# Patient Record
Sex: MALE | Race: WHITE | NOT HISPANIC OR LATINO | Employment: FULL TIME | URBAN - NONMETROPOLITAN AREA
[De-identification: names, ages, dates, MRNs, and addresses within clinical notes are randomized per-mention and may not be internally consistent; named-entity substitution may affect disease eponyms.]

---

## 2017-07-07 ENCOUNTER — HOSPITAL ENCOUNTER (EMERGENCY)
Facility: HOSPITAL | Age: 57
Discharge: HOME | End: 2017-07-07
Attending: EMERGENCY MEDICINE | Admitting: EMERGENCY MEDICINE

## 2017-07-07 ENCOUNTER — APPOINTMENT (OUTPATIENT)
Dept: CT IMAGING | Facility: HOSPITAL | Age: 57
End: 2017-07-07

## 2017-07-07 VITALS
OXYGEN SATURATION: 95 % | SYSTOLIC BLOOD PRESSURE: 150 MMHG | HEART RATE: 64 BPM | BODY MASS INDEX: 37.67 KG/M2 | TEMPERATURE: 98.8 F | DIASTOLIC BLOOD PRESSURE: 98 MMHG | HEIGHT: 67 IN | WEIGHT: 240 LBS | RESPIRATION RATE: 18 BRPM

## 2017-07-07 DIAGNOSIS — R10.13 EPIGASTRIC ABDOMINAL PAIN: Primary | ICD-10-CM

## 2017-07-07 LAB
ALBUMIN SERPL-MCNC: 4.1 G/DL (ref 3.5–5)
ALBUMIN/GLOB SERPL: 1.3 G/DL (ref 1–2)
ALP SERPL-CCNC: 105 U/L (ref 38–126)
ALT SERPL W P-5'-P-CCNC: 55 U/L (ref 13–69)
AMYLASE SERPL-CCNC: 92 U/L (ref 30–110)
ANION GAP SERPL CALCULATED.3IONS-SCNC: 13.1 MMOL/L
AST SERPL-CCNC: 50 U/L (ref 15–46)
BASOPHILS # BLD AUTO: 0.04 10*3/MM3 (ref 0–0.2)
BASOPHILS NFR BLD AUTO: 0.3 % (ref 0–2.5)
BILIRUB SERPL-MCNC: 0.3 MG/DL (ref 0.2–1.3)
BILIRUB UR QL STRIP: NEGATIVE
BUN BLD-MCNC: 15 MG/DL (ref 7–20)
BUN/CREAT SERPL: 15 (ref 6.3–21.9)
CALCIUM SPEC-SCNC: 9.4 MG/DL (ref 8.4–10.2)
CHLORIDE SERPL-SCNC: 107 MMOL/L (ref 98–107)
CLARITY UR: CLEAR
CO2 SERPL-SCNC: 29 MMOL/L (ref 26–30)
COLOR UR: YELLOW
CREAT BLD-MCNC: 1 MG/DL (ref 0.6–1.3)
DEPRECATED RDW RBC AUTO: 46.3 FL (ref 37–54)
EOSINOPHIL # BLD AUTO: 0.24 10*3/MM3 (ref 0–0.7)
EOSINOPHIL NFR BLD AUTO: 2 % (ref 0–7)
ERYTHROCYTE [DISTWIDTH] IN BLOOD BY AUTOMATED COUNT: 14.3 % (ref 11.5–14.5)
GFR SERPL CREATININE-BSD FRML MDRD: 78 ML/MIN/1.73
GLOBULIN UR ELPH-MCNC: 3.2 GM/DL
GLUCOSE BLD-MCNC: 89 MG/DL (ref 74–98)
GLUCOSE UR STRIP-MCNC: ABNORMAL MG/DL
HCT VFR BLD AUTO: 42.9 % (ref 42–52)
HGB BLD-MCNC: 14 G/DL (ref 14–18)
HGB UR QL STRIP.AUTO: NEGATIVE
IMM GRANULOCYTES # BLD: 0.05 10*3/MM3 (ref 0–0.06)
IMM GRANULOCYTES NFR BLD: 0.4 % (ref 0–0.6)
KETONES UR QL STRIP: NEGATIVE
LEUKOCYTE ESTERASE UR QL STRIP.AUTO: NEGATIVE
LIPASE SERPL-CCNC: 212 U/L (ref 23–300)
LYMPHOCYTES # BLD AUTO: 2.63 10*3/MM3 (ref 0.6–3.4)
LYMPHOCYTES NFR BLD AUTO: 22.4 % (ref 10–50)
MCH RBC QN AUTO: 29.3 PG (ref 27–31)
MCHC RBC AUTO-ENTMCNC: 32.6 G/DL (ref 30–37)
MCV RBC AUTO: 89.7 FL (ref 80–94)
MONOCYTES # BLD AUTO: 1.12 10*3/MM3 (ref 0–0.9)
MONOCYTES NFR BLD AUTO: 9.5 % (ref 0–12)
NEUTROPHILS # BLD AUTO: 7.67 10*3/MM3 (ref 2–6.9)
NEUTROPHILS NFR BLD AUTO: 65.4 % (ref 37–80)
NITRITE UR QL STRIP: NEGATIVE
NRBC BLD MANUAL-RTO: 0 /100 WBC (ref 0–0)
PH UR STRIP.AUTO: 6 [PH] (ref 5–8)
PLATELET # BLD AUTO: 296 10*3/MM3 (ref 130–400)
PMV BLD AUTO: 10 FL (ref 6–12)
POTASSIUM BLD-SCNC: 4.1 MMOL/L (ref 3.5–5.1)
PROT SERPL-MCNC: 7.3 G/DL (ref 6.3–8.2)
PROT UR QL STRIP: NEGATIVE
RBC # BLD AUTO: 4.78 10*6/MM3 (ref 4.7–6.1)
SODIUM BLD-SCNC: 145 MMOL/L (ref 137–145)
SP GR UR STRIP: 1.02 (ref 1–1.03)
TROPONIN I SERPL-MCNC: <0.012 NG/ML (ref 0–0.03)
UROBILINOGEN UR QL STRIP: ABNORMAL
WBC NRBC COR # BLD: 11.75 10*3/MM3 (ref 4.8–10.8)

## 2017-07-07 PROCEDURE — 99284 EMERGENCY DEPT VISIT MOD MDM: CPT

## 2017-07-07 PROCEDURE — 82150 ASSAY OF AMYLASE: CPT | Performed by: EMERGENCY MEDICINE

## 2017-07-07 PROCEDURE — 80053 COMPREHEN METABOLIC PANEL: CPT | Performed by: EMERGENCY MEDICINE

## 2017-07-07 PROCEDURE — 74177 CT ABD & PELVIS W/CONTRAST: CPT

## 2017-07-07 PROCEDURE — 0 DIATRIZOATE MEGLUMINE & SODIUM PER 1 ML: Performed by: EMERGENCY MEDICINE

## 2017-07-07 PROCEDURE — 93005 ELECTROCARDIOGRAM TRACING: CPT | Performed by: EMERGENCY MEDICINE

## 2017-07-07 PROCEDURE — 96374 THER/PROPH/DIAG INJ IV PUSH: CPT

## 2017-07-07 PROCEDURE — 84484 ASSAY OF TROPONIN QUANT: CPT | Performed by: EMERGENCY MEDICINE

## 2017-07-07 PROCEDURE — 81003 URINALYSIS AUTO W/O SCOPE: CPT | Performed by: EMERGENCY MEDICINE

## 2017-07-07 PROCEDURE — 0 IOPAMIDOL 61 % SOLUTION: Performed by: EMERGENCY MEDICINE

## 2017-07-07 PROCEDURE — 85025 COMPLETE CBC W/AUTO DIFF WBC: CPT | Performed by: EMERGENCY MEDICINE

## 2017-07-07 PROCEDURE — 83690 ASSAY OF LIPASE: CPT | Performed by: EMERGENCY MEDICINE

## 2017-07-07 RX ORDER — MELOXICAM 15 MG/1
15 TABLET ORAL DAILY
COMMUNITY
End: 2018-05-21

## 2017-07-07 RX ORDER — AZELASTINE HYDROCHLORIDE, FLUTICASONE PROPIONATE 137; 50 UG/1; UG/1
SPRAY, METERED NASAL
COMMUNITY
Start: 2014-09-29

## 2017-07-07 RX ORDER — PANTOPRAZOLE SODIUM 40 MG/10ML
80 INJECTION, POWDER, LYOPHILIZED, FOR SOLUTION INTRAVENOUS ONCE
Status: COMPLETED | OUTPATIENT
Start: 2017-07-07 | End: 2017-07-07

## 2017-07-07 RX ORDER — SODIUM CHLORIDE 0.9 % (FLUSH) 0.9 %
10 SYRINGE (ML) INJECTION AS NEEDED
Status: DISCONTINUED | OUTPATIENT
Start: 2017-07-07 | End: 2017-07-07 | Stop reason: HOSPADM

## 2017-07-07 RX ADMIN — IOPAMIDOL 100 ML: 612 INJECTION, SOLUTION INTRAVENOUS at 07:41

## 2017-07-07 RX ADMIN — PANTOPRAZOLE SODIUM 80 MG: 40 INJECTION, POWDER, FOR SOLUTION INTRAVENOUS at 05:29

## 2017-07-07 RX ADMIN — DIATRIZOATE MEGLUMINE AND DIATRIZOATE SODIUM 30 ML: 660; 100 LIQUID ORAL; RECTAL at 07:41

## 2017-07-07 NOTE — ED PROVIDER NOTES
Subjective   HPI Comments: Patient is a 56-year-old male who reports emergency department complaining of a 3 day history of upper abdominal discomfort.  He has no nausea vomiting or diarrhea and is tolerating by mouth without difficulty.  He states that the pain gets worse when he lies flat and he feels like he can't breathe due to the discomfort.  He feels bloated and distended in the upper abdomen.  No chest pain.      History provided by:  Patient      Review of Systems   Constitutional: Negative for chills and fever.   HENT: Negative for congestion.    Respiratory: Negative for cough and shortness of breath.    Cardiovascular: Negative for chest pain.   Gastrointestinal: Positive for abdominal distention and abdominal pain. Negative for constipation, diarrhea, nausea and vomiting.   Musculoskeletal: Negative for back pain and neck pain.   Neurological: Negative for headaches.   All other systems reviewed and are negative.      Past Medical History:   Diagnosis Date   • Hyperlipidemia        Allergies   Allergen Reactions   • No Known Drug Allergy        Past Surgical History:   Procedure Laterality Date   • CHOLECYSTECTOMY         History reviewed. No pertinent family history.    Social History     Social History   • Marital status:      Spouse name: N/A   • Number of children: N/A   • Years of education: N/A     Social History Main Topics   • Smoking status: Former Smoker   • Smokeless tobacco: None   • Alcohol use No   • Drug use: No   • Sexual activity: Defer     Other Topics Concern   • None     Social History Narrative   • None           Objective   Physical Exam   Constitutional: He is oriented to person, place, and time. He appears well-developed and well-nourished. No distress.   Somewhat obese but otherwise healthy-appearing   HENT:   Head: Normocephalic and atraumatic.   Eyes: EOM are normal. Pupils are equal, round, and reactive to light.   Neck: Neck supple.   Cardiovascular: Normal rate,  regular rhythm and normal heart sounds.    Pulmonary/Chest: Effort normal and breath sounds normal.   Abdominal: Soft.   Protuberant abdomen but not obviously distended, moderate tenderness to palpation in the epigastric and especially right upper quadrant.  No other abdominal tenderness   Musculoskeletal: Normal range of motion. He exhibits no edema.   Neurological: He is alert and oriented to person, place, and time.   Skin: Skin is warm and dry. He is not diaphoretic.   Psychiatric: He has a normal mood and affect. His behavior is normal. Thought content normal.   Nursing note and vitals reviewed.      ECG 12 Lead    Date/Time: 7/7/2017 5:45 AM  Performed by: LOGAN REBOLLAR  Authorized by: LOGAN REBOLLAR   Interpreted by physician  Rhythm: sinus rhythm  Rate: normal  QRS axis: normal  Clinical impression: non-specific ECG               ED Course  ED Course                  MDM    Final diagnoses:   Epigastric abdominal pain            Logan Rebollar MD  07/07/17 8743

## 2017-07-07 NOTE — ED PROVIDER NOTES
700am Assumed care from off-going team. Briefly, this is a 56 y.o. M here w/ epigastric abd pain, reassuring VS and exam, unremarkable labs who is awaiting CT abd/pelvs. Plan to f/u on imaging and reassess.     8:28 AM Pt states pain improved. CT abd/pelvis unremarkable. Discussed results w/ pt as well as the importance of f/u within the next 2-3 days w/ pcp for re-evaluation. Discussed return to care precautions.       Gasper Alicea MD  07/07/17 1521

## 2018-01-22 ENCOUNTER — TRANSCRIBE ORDERS (OUTPATIENT)
Dept: MRI IMAGING | Facility: HOSPITAL | Age: 58
End: 2018-01-22

## 2018-01-22 ENCOUNTER — TRANSCRIBE ORDERS (OUTPATIENT)
Dept: CT IMAGING | Facility: HOSPITAL | Age: 58
End: 2018-01-22

## 2018-01-22 DIAGNOSIS — M25.462 KNEE EFFUSION, LEFT: Primary | ICD-10-CM

## 2018-01-22 DIAGNOSIS — M25.551 RIGHT HIP PAIN: Primary | ICD-10-CM

## 2018-01-22 DIAGNOSIS — G44.309 POST-TRAUMATIC HEADACHE, NOT INTRACTABLE, UNSPECIFIED CHRONICITY PATTERN: Primary | ICD-10-CM

## 2018-01-23 ENCOUNTER — HOSPITAL ENCOUNTER (OUTPATIENT)
Dept: CT IMAGING | Facility: HOSPITAL | Age: 58
Discharge: HOME | End: 2018-01-23

## 2018-01-23 ENCOUNTER — HOSPITAL ENCOUNTER (OUTPATIENT)
Dept: MRI IMAGING | Facility: HOSPITAL | Age: 58
Discharge: HOME | End: 2018-01-23
Admitting: NURSE PRACTITIONER

## 2018-01-23 DIAGNOSIS — M25.462 KNEE EFFUSION, LEFT: ICD-10-CM

## 2018-01-23 DIAGNOSIS — G44.309 POST-TRAUMATIC HEADACHE, NOT INTRACTABLE, UNSPECIFIED CHRONICITY PATTERN: ICD-10-CM

## 2018-01-23 DIAGNOSIS — M25.551 RIGHT HIP PAIN: ICD-10-CM

## 2018-01-23 PROCEDURE — 73721 MRI JNT OF LWR EXTRE W/O DYE: CPT

## 2018-01-23 PROCEDURE — 70450 CT HEAD/BRAIN W/O DYE: CPT

## 2018-01-23 PROCEDURE — 73700 CT LOWER EXTREMITY W/O DYE: CPT

## 2018-11-24 ENCOUNTER — HOSPITAL ENCOUNTER (EMERGENCY)
Facility: HOSPITAL | Age: 58
Discharge: HOME | End: 2018-11-24
Attending: EMERGENCY MEDICINE | Admitting: EMERGENCY MEDICINE

## 2018-11-24 VITALS
HEART RATE: 58 BPM | HEIGHT: 67 IN | DIASTOLIC BLOOD PRESSURE: 94 MMHG | BODY MASS INDEX: 36.1 KG/M2 | TEMPERATURE: 97.7 F | WEIGHT: 230 LBS | SYSTOLIC BLOOD PRESSURE: 135 MMHG | OXYGEN SATURATION: 97 % | RESPIRATION RATE: 16 BRPM

## 2018-11-24 DIAGNOSIS — R10.13 EPIGASTRIC PAIN: Primary | ICD-10-CM

## 2018-11-24 LAB
ALBUMIN SERPL-MCNC: 4.2 G/DL (ref 3.5–5)
ALBUMIN/GLOB SERPL: 1.6 G/DL (ref 1–2)
ALP SERPL-CCNC: 100 U/L (ref 38–126)
ALT SERPL W P-5'-P-CCNC: 26 U/L (ref 13–69)
ANION GAP SERPL CALCULATED.3IONS-SCNC: 8 MMOL/L (ref 10–20)
AST SERPL-CCNC: 25 U/L (ref 15–46)
BASOPHILS # BLD AUTO: 0.05 10*3/MM3 (ref 0–0.2)
BASOPHILS NFR BLD AUTO: 0.5 % (ref 0–2.5)
BILIRUB SERPL-MCNC: 0.3 MG/DL (ref 0.2–1.3)
BUN BLD-MCNC: 20 MG/DL (ref 7–20)
BUN/CREAT SERPL: 16.7 (ref 6.3–21.9)
CALCIUM SPEC-SCNC: 8.4 MG/DL (ref 8.4–10.2)
CHLORIDE SERPL-SCNC: 107 MMOL/L (ref 98–107)
CO2 SERPL-SCNC: 27 MMOL/L (ref 26–30)
CREAT BLD-MCNC: 1.2 MG/DL (ref 0.6–1.3)
DEPRECATED RDW RBC AUTO: 44.3 FL (ref 37–54)
EOSINOPHIL # BLD AUTO: 0.01 10*3/MM3 (ref 0–0.7)
EOSINOPHIL NFR BLD AUTO: 0.1 % (ref 0–7)
ERYTHROCYTE [DISTWIDTH] IN BLOOD BY AUTOMATED COUNT: 14.6 % (ref 11.5–14.5)
GFR SERPL CREATININE-BSD FRML MDRD: 63 ML/MIN/1.73
GLOBULIN UR ELPH-MCNC: 2.7 GM/DL
GLUCOSE BLD-MCNC: 123 MG/DL (ref 74–98)
HCT VFR BLD AUTO: 39.5 % (ref 42–52)
HGB BLD-MCNC: 12.5 G/DL (ref 14–18)
IMM GRANULOCYTES # BLD: 0.04 10*3/MM3 (ref 0–0.06)
IMM GRANULOCYTES NFR BLD: 0.4 % (ref 0–0.6)
LIPASE SERPL-CCNC: 351 U/L (ref 23–300)
LYMPHOCYTES # BLD AUTO: 2.47 10*3/MM3 (ref 0.6–3.4)
LYMPHOCYTES NFR BLD AUTO: 22.7 % (ref 10–50)
MCH RBC QN AUTO: 26.4 PG (ref 27–31)
MCHC RBC AUTO-ENTMCNC: 31.6 G/DL (ref 30–37)
MCV RBC AUTO: 83.3 FL (ref 80–94)
MONOCYTES # BLD AUTO: 0.78 10*3/MM3 (ref 0–0.9)
MONOCYTES NFR BLD AUTO: 7.2 % (ref 0–12)
NEUTROPHILS # BLD AUTO: 7.54 10*3/MM3 (ref 2–6.9)
NEUTROPHILS NFR BLD AUTO: 69.1 % (ref 37–80)
NRBC BLD MANUAL-RTO: 0 /100 WBC (ref 0–0)
PLATELET # BLD AUTO: 260 10*3/MM3 (ref 130–400)
PMV BLD AUTO: 10.6 FL (ref 6–12)
POTASSIUM BLD-SCNC: 4 MMOL/L (ref 3.5–5.1)
PROT SERPL-MCNC: 6.9 G/DL (ref 6.3–8.2)
RBC # BLD AUTO: 4.74 10*6/MM3 (ref 4.7–6.1)
SODIUM BLD-SCNC: 138 MMOL/L (ref 137–145)
WBC NRBC COR # BLD: 10.89 10*3/MM3 (ref 4.8–10.8)

## 2018-11-24 PROCEDURE — 99283 EMERGENCY DEPT VISIT LOW MDM: CPT

## 2018-11-24 PROCEDURE — 80053 COMPREHEN METABOLIC PANEL: CPT | Performed by: EMERGENCY MEDICINE

## 2018-11-24 PROCEDURE — 83690 ASSAY OF LIPASE: CPT | Performed by: EMERGENCY MEDICINE

## 2018-11-24 PROCEDURE — 96374 THER/PROPH/DIAG INJ IV PUSH: CPT

## 2018-11-24 PROCEDURE — 85025 COMPLETE CBC W/AUTO DIFF WBC: CPT | Performed by: EMERGENCY MEDICINE

## 2018-11-24 RX ORDER — SODIUM CHLORIDE 0.9 % (FLUSH) 0.9 %
10 SYRINGE (ML) INJECTION AS NEEDED
Status: DISCONTINUED | OUTPATIENT
Start: 2018-11-24 | End: 2018-11-24 | Stop reason: HOSPADM

## 2018-11-24 RX ORDER — FAMOTIDINE 10 MG/ML
20 INJECTION, SOLUTION INTRAVENOUS ONCE
Status: COMPLETED | OUTPATIENT
Start: 2018-11-24 | End: 2018-11-24

## 2018-11-24 RX ORDER — ALUMINA, MAGNESIA, AND SIMETHICONE 2400; 2400; 240 MG/30ML; MG/30ML; MG/30ML
15 SUSPENSION ORAL ONCE
Status: COMPLETED | OUTPATIENT
Start: 2018-11-24 | End: 2018-11-24

## 2018-11-24 RX ORDER — FAMOTIDINE 20 MG/1
20 TABLET, FILM COATED ORAL 2 TIMES DAILY
Qty: 14 TABLET | Refills: 0 | Status: SHIPPED | OUTPATIENT
Start: 2018-11-24 | End: 2022-03-17

## 2018-11-24 RX ADMIN — LIDOCAINE HYDROCHLORIDE 15 ML: 20 SOLUTION ORAL; TOPICAL at 03:01

## 2018-11-24 RX ADMIN — ALUMINUM HYDROXIDE, MAGNESIUM HYDROXIDE, AND DIMETHICONE 15 ML: 400; 400; 40 SUSPENSION ORAL at 03:01

## 2018-11-24 RX ADMIN — FAMOTIDINE 20 MG: 10 INJECTION, SOLUTION INTRAVENOUS at 03:04

## 2018-11-24 NOTE — ED PROVIDER NOTES
Subjective   History of Present Illness   57M otherwise healthy presenting with epigastric pain.  States this started about 30 hours ago after eating dinner.  He did not get better after omeprazole nor Tums.  It eased some drainage today but came back after he ate last night.  Denies fevers, chills, nausea, vomiting, dysuria, or other symptoms.    Review of Systems   Gastrointestinal: Positive for abdominal pain.   All other systems reviewed and are negative.      Past Medical History:   Diagnosis Date   • Hyperlipidemia        Allergies   Allergen Reactions   • No Known Drug Allergy        Past Surgical History:   Procedure Laterality Date   • CHOLECYSTECTOMY         No family history on file.    Social History     Socioeconomic History   • Marital status:      Spouse name: Not on file   • Number of children: Not on file   • Years of education: Not on file   • Highest education level: Not on file   Tobacco Use   • Smoking status: Former Smoker   Substance and Sexual Activity   • Alcohol use: No   • Drug use: No   • Sexual activity: Defer           Objective   Physical Exam   Constitutional: He is oriented to person, place, and time. He appears well-developed and well-nourished.  Non-toxic appearance. He does not appear ill. No distress.   HENT:   Head: Normocephalic.   Mouth/Throat: Oropharynx is clear and moist.   Eyes: Conjunctivae are normal. No scleral icterus.   Neck: Normal range of motion. Neck supple. No tracheal deviation present.   Cardiovascular: Normal rate, regular rhythm, normal heart sounds and intact distal pulses. Exam reveals no gallop and no friction rub.   No murmur heard.  Pulmonary/Chest: Effort normal and breath sounds normal. No stridor. No respiratory distress. He has no wheezes. He has no rales. He exhibits no tenderness.   Abdominal: Soft. He exhibits no shifting dullness, no distension, no fluid wave, no ascites, no pulsatile midline mass and no mass. There is no  hepatosplenomegaly, splenomegaly or hepatomegaly. There is tenderness in the epigastric area. There is no rigidity, no rebound and no guarding.   Musculoskeletal: Normal range of motion. He exhibits no deformity.   Neurological: He is alert and oriented to person, place, and time.   Skin: Skin is warm and dry. No rash noted. He is not diaphoretic. No erythema. No pallor.   Psychiatric: He has a normal mood and affect. His behavior is normal.   Nursing note and vitals reviewed.      Procedures           ED Course                  MDM  57M here w/ epigastric abd pain. AFVSS.  Mild epigastric tenderness to palpation.  No right upper quadrant pain suggestive of cholecystitis.  Differential broad but includes gastritis, peptic ulcer, less likely cholecystitis or pancreatitis, less likely transverse colitis.  Plan for labs, symptomatically treatment, and will reassess need for imaging.    3:17 AM labs are reassuring.  Patient states that he is feeling improved after passing gas.  I did offer CT scan for further evaluation, but did note that he has had similar presentation about one year ago with CT scan is unremarkable.  He stated that he felt comfortable going home and would come back or follow-up with primary care if he is not feeling improved or has any worsening.  We'll discharge home with return to care precautions and prescription for Pepcid.    Final diagnoses:   Epigastric pain            Gasper Alicea MD  11/24/18 3323

## 2019-03-29 ENCOUNTER — TRANSCRIBE ORDERS (OUTPATIENT)
Dept: ADMINISTRATIVE | Facility: HOSPITAL | Age: 59
End: 2019-03-29

## 2019-03-29 DIAGNOSIS — J34.89 SINUS PAIN: Primary | ICD-10-CM

## 2019-04-04 ENCOUNTER — APPOINTMENT (OUTPATIENT)
Dept: CT IMAGING | Facility: HOSPITAL | Age: 59
End: 2019-04-04

## 2019-04-10 ENCOUNTER — APPOINTMENT (OUTPATIENT)
Dept: CT IMAGING | Facility: HOSPITAL | Age: 59
End: 2019-04-10

## 2019-04-10 ENCOUNTER — HOSPITAL ENCOUNTER (OUTPATIENT)
Dept: CT IMAGING | Facility: HOSPITAL | Age: 59
Discharge: HOME | End: 2019-04-10
Admitting: ALLERGY & IMMUNOLOGY

## 2019-04-10 DIAGNOSIS — J34.89 SINUS PAIN: ICD-10-CM

## 2019-04-10 PROCEDURE — 70486 CT MAXILLOFACIAL W/O DYE: CPT

## 2020-01-24 PROCEDURE — 99283 EMERGENCY DEPT VISIT LOW MDM: CPT

## 2020-01-25 ENCOUNTER — HOSPITAL ENCOUNTER (EMERGENCY)
Facility: HOSPITAL | Age: 60
Discharge: HOME | End: 2020-01-25
Attending: EMERGENCY MEDICINE | Admitting: EMERGENCY MEDICINE

## 2020-01-25 VITALS
WEIGHT: 246.6 LBS | DIASTOLIC BLOOD PRESSURE: 88 MMHG | TEMPERATURE: 97.4 F | SYSTOLIC BLOOD PRESSURE: 127 MMHG | OXYGEN SATURATION: 95 % | RESPIRATION RATE: 18 BRPM | HEART RATE: 62 BPM | HEIGHT: 67 IN | BODY MASS INDEX: 38.71 KG/M2

## 2020-01-25 DIAGNOSIS — R10.9 ABDOMINAL PAIN, UNSPECIFIED ABDOMINAL LOCATION: Primary | ICD-10-CM

## 2020-01-25 LAB
ALBUMIN SERPL-MCNC: 4 G/DL (ref 3.5–5.2)
ALBUMIN/GLOB SERPL: 1.3 G/DL
ALP SERPL-CCNC: 129 U/L (ref 39–117)
ALT SERPL W P-5'-P-CCNC: 27 U/L (ref 1–41)
ANION GAP SERPL CALCULATED.3IONS-SCNC: 10.4 MMOL/L (ref 5–15)
AST SERPL-CCNC: 25 U/L (ref 1–40)
BASOPHILS # BLD AUTO: 0.06 10*3/MM3 (ref 0–0.2)
BASOPHILS NFR BLD AUTO: 0.8 % (ref 0–1.5)
BILIRUB SERPL-MCNC: 0.2 MG/DL (ref 0.2–1.2)
BILIRUB UR QL STRIP: NEGATIVE
BUN BLD-MCNC: 15 MG/DL (ref 6–20)
BUN/CREAT SERPL: 14.2 (ref 7–25)
CALCIUM SPEC-SCNC: 9 MG/DL (ref 8.6–10.5)
CHLORIDE SERPL-SCNC: 104 MMOL/L (ref 98–107)
CLARITY UR: CLEAR
CO2 SERPL-SCNC: 25.6 MMOL/L (ref 22–29)
COLOR UR: YELLOW
CREAT BLD-MCNC: 1.06 MG/DL (ref 0.76–1.27)
DEPRECATED RDW RBC AUTO: 42 FL (ref 37–54)
EOSINOPHIL # BLD AUTO: 0.54 10*3/MM3 (ref 0–0.4)
EOSINOPHIL NFR BLD AUTO: 7 % (ref 0.3–6.2)
ERYTHROCYTE [DISTWIDTH] IN BLOOD BY AUTOMATED COUNT: 13.3 % (ref 12.3–15.4)
GFR SERPL CREATININE-BSD FRML MDRD: 72 ML/MIN/1.73
GLOBULIN UR ELPH-MCNC: 3 GM/DL
GLUCOSE BLD-MCNC: 140 MG/DL (ref 65–99)
GLUCOSE UR STRIP-MCNC: NEGATIVE MG/DL
HCT VFR BLD AUTO: 45 % (ref 37.5–51)
HGB BLD-MCNC: 15 G/DL (ref 13–17.7)
HGB UR QL STRIP.AUTO: NEGATIVE
HOLD SPECIMEN: NORMAL
HOLD SPECIMEN: NORMAL
IMM GRANULOCYTES # BLD AUTO: 0.01 10*3/MM3 (ref 0–0.05)
IMM GRANULOCYTES NFR BLD AUTO: 0.1 % (ref 0–0.5)
KETONES UR QL STRIP: NEGATIVE
LEUKOCYTE ESTERASE UR QL STRIP.AUTO: NEGATIVE
LIPASE SERPL-CCNC: 51 U/L (ref 13–60)
LYMPHOCYTES # BLD AUTO: 2.73 10*3/MM3 (ref 0.7–3.1)
LYMPHOCYTES NFR BLD AUTO: 35.5 % (ref 19.6–45.3)
MCH RBC QN AUTO: 28.7 PG (ref 26.6–33)
MCHC RBC AUTO-ENTMCNC: 33.3 G/DL (ref 31.5–35.7)
MCV RBC AUTO: 86.2 FL (ref 79–97)
MONOCYTES # BLD AUTO: 0.6 10*3/MM3 (ref 0.1–0.9)
MONOCYTES NFR BLD AUTO: 7.8 % (ref 5–12)
NEUTROPHILS # BLD AUTO: 3.76 10*3/MM3 (ref 1.7–7)
NEUTROPHILS NFR BLD AUTO: 48.8 % (ref 42.7–76)
NITRITE UR QL STRIP: NEGATIVE
NRBC BLD AUTO-RTO: 0 /100 WBC (ref 0–0.2)
PH UR STRIP.AUTO: <=5 [PH] (ref 5–8)
PLATELET # BLD AUTO: 263 10*3/MM3 (ref 140–450)
PMV BLD AUTO: 10.3 FL (ref 6–12)
POTASSIUM BLD-SCNC: 4 MMOL/L (ref 3.5–5.2)
PROT SERPL-MCNC: 7 G/DL (ref 6–8.5)
PROT UR QL STRIP: NEGATIVE
RBC # BLD AUTO: 5.22 10*6/MM3 (ref 4.14–5.8)
SODIUM BLD-SCNC: 140 MMOL/L (ref 136–145)
SP GR UR STRIP: 1.02 (ref 1–1.03)
UROBILINOGEN UR QL STRIP: NORMAL
WBC NRBC COR # BLD: 7.7 10*3/MM3 (ref 3.4–10.8)
WHOLE BLOOD HOLD SPECIMEN: NORMAL
WHOLE BLOOD HOLD SPECIMEN: NORMAL

## 2020-01-25 PROCEDURE — 81003 URINALYSIS AUTO W/O SCOPE: CPT | Performed by: EMERGENCY MEDICINE

## 2020-01-25 PROCEDURE — 25010000002 ONDANSETRON PER 1 MG: Performed by: EMERGENCY MEDICINE

## 2020-01-25 PROCEDURE — 80053 COMPREHEN METABOLIC PANEL: CPT | Performed by: EMERGENCY MEDICINE

## 2020-01-25 PROCEDURE — 83690 ASSAY OF LIPASE: CPT | Performed by: EMERGENCY MEDICINE

## 2020-01-25 PROCEDURE — 96375 TX/PRO/DX INJ NEW DRUG ADDON: CPT

## 2020-01-25 PROCEDURE — 96374 THER/PROPH/DIAG INJ IV PUSH: CPT

## 2020-01-25 PROCEDURE — 85025 COMPLETE CBC W/AUTO DIFF WBC: CPT | Performed by: EMERGENCY MEDICINE

## 2020-01-25 RX ORDER — SUCRALFATE 1 G/1
1 TABLET ORAL 4 TIMES DAILY
Qty: 40 TABLET | Refills: 0 | Status: SHIPPED | OUTPATIENT
Start: 2020-01-25 | End: 2022-03-17

## 2020-01-25 RX ORDER — ONDANSETRON 2 MG/ML
4 INJECTION INTRAMUSCULAR; INTRAVENOUS ONCE
Status: COMPLETED | OUTPATIENT
Start: 2020-01-25 | End: 2020-01-25

## 2020-01-25 RX ORDER — ALUMINA, MAGNESIA, AND SIMETHICONE 2400; 2400; 240 MG/30ML; MG/30ML; MG/30ML
15 SUSPENSION ORAL ONCE
Status: COMPLETED | OUTPATIENT
Start: 2020-01-25 | End: 2020-01-25

## 2020-01-25 RX ORDER — SODIUM CHLORIDE 0.9 % (FLUSH) 0.9 %
10 SYRINGE (ML) INJECTION AS NEEDED
Status: DISCONTINUED | OUTPATIENT
Start: 2020-01-25 | End: 2020-01-25 | Stop reason: HOSPADM

## 2020-01-25 RX ORDER — LIDOCAINE HYDROCHLORIDE 20 MG/ML
15 SOLUTION OROPHARYNGEAL ONCE
Status: COMPLETED | OUTPATIENT
Start: 2020-01-25 | End: 2020-01-25

## 2020-01-25 RX ORDER — ONDANSETRON 4 MG/1
4 TABLET, ORALLY DISINTEGRATING ORAL EVERY 8 HOURS PRN
Qty: 12 TABLET | Refills: 0 | Status: SHIPPED | OUTPATIENT
Start: 2020-01-25 | End: 2022-03-17

## 2020-01-25 RX ORDER — FAMOTIDINE 10 MG/ML
20 INJECTION, SOLUTION INTRAVENOUS ONCE
Status: COMPLETED | OUTPATIENT
Start: 2020-01-25 | End: 2020-01-25

## 2020-01-25 RX ADMIN — ALUMINUM HYDROXIDE, MAGNESIUM HYDROXIDE, AND DIMETHICONE 15 ML: 400; 400; 40 SUSPENSION ORAL at 01:26

## 2020-01-25 RX ADMIN — FAMOTIDINE 20 MG: 10 INJECTION, SOLUTION INTRAVENOUS at 01:26

## 2020-01-25 RX ADMIN — LIDOCAINE HYDROCHLORIDE 15 ML: 20 SOLUTION ORAL; TOPICAL at 01:27

## 2020-01-25 RX ADMIN — ONDANSETRON 4 MG: 2 INJECTION INTRAMUSCULAR; INTRAVENOUS at 01:26

## 2020-01-25 NOTE — ED PROVIDER NOTES
TRIAGE CHIEF COMPLAINT:     Nursing and triage notes reviewed    Chief Complaint   Patient presents with   • Abdominal Pain      HPI: Irineo Jewell is a 59 y.o. male who presents to the emergency department complaining of abdominal discomfort.  Patient states he has had epigastric abdominal pain for approximately the past week.  Symptoms are intermittent.  Patient also has some diarrhea today.  Patient saw his primary care physician who placed him on Protonix.  He states this helped his symptoms initially however they have started up again.  He states symptoms are worse at night when he lays down.  He states he is having trouble sleeping and that is why came in.  Denies chest pain or shortness of breath.    REVIEW OF SYSTEMS: All other systems reviewed and are negative     PAST MEDICAL HISTORY:   Past Medical History:   Diagnosis Date   • Hyperlipidemia    • Palpitations         FAMILY HISTORY:   History reviewed. No pertinent family history.     SOCIAL HISTORY:   Social History     Socioeconomic History   • Marital status:      Spouse name: Not on file   • Number of children: Not on file   • Years of education: Not on file   • Highest education level: Not on file   Tobacco Use   • Smoking status: Former Smoker   Substance and Sexual Activity   • Alcohol use: No   • Drug use: No   • Sexual activity: Defer        SURGICAL HISTORY:   Past Surgical History:   Procedure Laterality Date   • CHOLECYSTECTOMY     • KNEE SURGERY          CURRENT MEDICATIONS:      Medication List      ASK your doctor about these medications    allopurinol 100 MG tablet  Commonly known as:  ZYLOPRIM     amitriptyline 25 MG tablet  Commonly known as:  ELAVIL     ARNUITY ELLIPTA 100 MCG/ACT aerosol powder   Generic drug:  Fluticasone Furoate     * busPIRone 5 MG tablet  Commonly known as:  BUSPAR     * busPIRone 10 MG tablet  Commonly known as:  BUSPAR     cetirizine 10 MG tablet  Commonly known as:  zyrTEC     [employer] PAIN RELIEF 500  MG tablet  Generic drug:  acetaminophen     cyclobenzaprine 10 MG tablet  Commonly known as:  FLEXERIL  Take 1 tablet by mouth 3 (Three) Times a Day. May cause drowsiness     diclofenac 1 % gel gel  Commonly known as:  VOLTAREN  Apply  topically 3 (Three) Times a Day.     DYMISTA 137-50 MCG/ACT suspension  Generic drug:  Azelastine-Fluticasone     famotidine 20 MG tablet  Commonly known as:  PEPCID  Take 1 tablet by mouth 2 (Two) Times a Day.     finasteride 5 MG tablet  Commonly known as:  PROSCAR     fluticasone 50 MCG/ACT nasal spray  Commonly known as:  FLONASE     gabapentin 100 MG capsule  Commonly known as:  NEURONTIN     hydroxychloroquine 200 MG tablet  Commonly known as:  PLAQUENIL     hydrOXYzine pamoate 25 MG capsule  Commonly known as:  VISTARIL     ibuprofen 600 MG tablet  Commonly known as:  ADVIL,MOTRIN  Take 1 tablet by mouth Every 6 (Six) Hours As Needed for Mild Pain .     lisinopril 10 MG tablet  Commonly known as:  PRINIVIL,ZESTRIL     melatonin 1 MG tablet     metFORMIN 500 MG tablet  Commonly known as:  GLUCOPHAGE     methocarbamol 500 MG tablet  Commonly known as:  ROBAXIN     montelukast 10 MG tablet  Commonly known as:  SINGULAIR     PANTOPRAZOLE SODIUM PO     polyethylene glycol powder  Commonly known as:  MIRALAX     predniSONE 20 MG tablet  Commonly known as:  DELTASONE  1 po daily with food     sertraline 50 MG tablet  Commonly known as:  ZOLOFT     tamsulosin 0.4 MG capsule 24 hr capsule  Commonly known as:  FLOMAX     Testosterone Cypionate 200 MG/ML injection  Commonly known as:  DEPOTESTOTERONE CYPIONATE     topiramate 50 MG tablet  Commonly known as:  TOPAMAX     vitamin D 1.25 MG (20238 UT) capsule capsule  Commonly known as:  ERGOCALCIFEROL         * This list has 2 medication(s) that are the same as other medications   prescribed for you. Read the directions carefully, and ask your doctor or   other care provider to review them with you.               ALLERGIES: No known drug  allergy     PHYSICAL EXAM:   VITAL SIGNS:   Vitals:    01/25/20 0227   BP:    Pulse: 61   Resp:    Temp:    SpO2:       CONSTITUTIONAL: Awake, oriented, appears non-toxic   HENT: Atraumatic, normocephalic, oral mucosa pink and moist, airway patent.  EYES: Conjunctiva clear  NECK: Trachea midline   CARDIOVASCULAR: Normal heart rate, Normal rhythm, No murmurs, rubs, gallops   PULMONARY/CHEST: Clear to auscultation, no rhonchi, wheezes, or rales. Symmetrical breath sounds.  ABDOMINAL: Non-distended, soft, there is mild epigastric and left upper quadrant tenderness- no rebound or guarding.   NEUROLOGIC: Non-focal, moving all four extremities, no gross sensory or motor deficits.   EXTREMITIES: No clubbing, cyanosis, or edema   SKIN: Warm, Dry, No erythema, No rash     ED COURSE / MEDICAL DECISION MAKING:   Irineo Jewell is a 59 y.o. male who presents to the emergency department for evaluation of анна discomfort.  Patient nondistressed on arrival in emergency department.  Vital signs are stable on arrival.  Physical exam reveals mild left upper quadrant and epigastric tenderness.  Exam otherwise unremarkable.  No rebound or guarding or peritoneal signs on my examination.  Have low suspicion for acute intra-abdominal process.  Patient is given a GI cocktail with resolution of his discomfort.  Patient had unremarkable laboratory tests in the emergency department.  Do not feel CT scan imaging is currently indicated.  Did tell patient that should his symptoms change or worsen that further work-up might be indicated at that time.  Patient expressed understanding and was requesting to be discharged home.    DECISION TO DISCHARGE/ADMIT: see ED care timeline     FINAL IMPRESSION:   1 --abdominal pain  2 --   3 --     Electronically signed by: Akila Randle MD, 1/25/2020 2:43 AM       Akila Randle MD  01/25/20 0245

## 2020-06-12 ENCOUNTER — TRANSCRIBE ORDERS (OUTPATIENT)
Dept: ADMINISTRATIVE | Facility: HOSPITAL | Age: 60
End: 2020-06-12

## 2020-06-12 DIAGNOSIS — R51.9 NONINTRACTABLE HEADACHE, UNSPECIFIED CHRONICITY PATTERN, UNSPECIFIED HEADACHE TYPE: Primary | ICD-10-CM

## 2020-12-11 ENCOUNTER — TRANSCRIBE ORDERS (OUTPATIENT)
Dept: ADMINISTRATIVE | Facility: HOSPITAL | Age: 60
End: 2020-12-11

## 2020-12-11 ENCOUNTER — TRANSCRIBE ORDERS (OUTPATIENT)
Dept: GENERAL RADIOLOGY | Facility: HOSPITAL | Age: 60
End: 2020-12-11

## 2020-12-11 ENCOUNTER — HOSPITAL ENCOUNTER (OUTPATIENT)
Dept: GENERAL RADIOLOGY | Facility: HOSPITAL | Age: 60
Discharge: HOME | End: 2020-12-11
Admitting: INTERNAL MEDICINE

## 2020-12-11 DIAGNOSIS — M54.50 LOW BACK PAIN, UNSPECIFIED BACK PAIN LATERALITY, UNSPECIFIED CHRONICITY, UNSPECIFIED WHETHER SCIATICA PRESENT: ICD-10-CM

## 2020-12-11 DIAGNOSIS — M54.50 LOW BACK PAIN, UNSPECIFIED BACK PAIN LATERALITY, UNSPECIFIED CHRONICITY, UNSPECIFIED WHETHER SCIATICA PRESENT: Primary | ICD-10-CM

## 2020-12-11 DIAGNOSIS — J45.909 ALLERGIC ASTHMA WITH STATED CAUSE: Primary | ICD-10-CM

## 2020-12-11 PROCEDURE — 72100 X-RAY EXAM L-S SPINE 2/3 VWS: CPT

## 2021-01-06 ENCOUNTER — IMMUNIZATION (OUTPATIENT)
Dept: VACCINE CLINIC | Facility: HOSPITAL | Age: 61
End: 2021-01-06

## 2021-01-06 PROCEDURE — 91301 HC SARSCO02 VAC 100MCG/0.5ML IM: CPT | Performed by: INTERNAL MEDICINE

## 2021-01-06 PROCEDURE — 0011A: CPT | Performed by: INTERNAL MEDICINE

## 2021-01-26 ENCOUNTER — TRANSCRIBE ORDERS (OUTPATIENT)
Dept: ADMINISTRATIVE | Facility: HOSPITAL | Age: 61
End: 2021-01-26

## 2021-01-26 DIAGNOSIS — R10.13 EPIGASTRIC PAIN: Primary | ICD-10-CM

## 2021-01-27 ENCOUNTER — HOSPITAL ENCOUNTER (OUTPATIENT)
Dept: PULMONOLOGY | Facility: HOSPITAL | Age: 61
Discharge: HOME | End: 2021-01-27
Admitting: INTERNAL MEDICINE

## 2021-01-27 DIAGNOSIS — J45.909 ALLERGIC ASTHMA WITH STATED CAUSE: ICD-10-CM

## 2021-01-27 PROCEDURE — 94010 BREATHING CAPACITY TEST: CPT | Performed by: INTERNAL MEDICINE

## 2021-01-27 PROCEDURE — 94010 BREATHING CAPACITY TEST: CPT

## 2021-02-02 ENCOUNTER — HOSPITAL ENCOUNTER (OUTPATIENT)
Dept: CT IMAGING | Facility: HOSPITAL | Age: 61
Discharge: HOME | End: 2021-02-02
Admitting: INTERNAL MEDICINE

## 2021-02-02 DIAGNOSIS — R10.13 EPIGASTRIC PAIN: ICD-10-CM

## 2021-02-02 PROCEDURE — 74177 CT ABD & PELVIS W/CONTRAST: CPT

## 2021-02-02 PROCEDURE — 25010000002 IOPAMIDOL 61 % SOLUTION: Performed by: INTERNAL MEDICINE

## 2021-02-02 RX ADMIN — IOPAMIDOL 100 ML: 612 INJECTION, SOLUTION INTRAVENOUS at 09:03

## 2021-02-03 ENCOUNTER — IMMUNIZATION (OUTPATIENT)
Dept: VACCINE CLINIC | Facility: HOSPITAL | Age: 61
End: 2021-02-03

## 2021-02-03 PROCEDURE — 0012A: CPT | Performed by: INTERNAL MEDICINE

## 2021-02-03 PROCEDURE — 91301 HC SARSCO02 VAC 100MCG/0.5ML IM: CPT | Performed by: INTERNAL MEDICINE

## 2021-05-25 ENCOUNTER — HOSPITAL ENCOUNTER (EMERGENCY)
Facility: HOSPITAL | Age: 61
Discharge: LEFT BEFORE DISCHARGE | End: 2021-05-25
Attending: EMERGENCY MEDICINE | Admitting: EMERGENCY MEDICINE

## 2021-05-25 VITALS
HEART RATE: 74 BPM | WEIGHT: 253.2 LBS | DIASTOLIC BLOOD PRESSURE: 81 MMHG | OXYGEN SATURATION: 98 % | BODY MASS INDEX: 39.74 KG/M2 | RESPIRATION RATE: 18 BRPM | SYSTOLIC BLOOD PRESSURE: 125 MMHG | TEMPERATURE: 97.5 F | HEIGHT: 67 IN

## 2021-05-25 DIAGNOSIS — R10.10 UPPER ABDOMINAL PAIN: Primary | ICD-10-CM

## 2021-05-25 LAB
ALBUMIN SERPL-MCNC: 4 G/DL (ref 3.5–5.2)
ALBUMIN/GLOB SERPL: 1.4 G/DL
ALP SERPL-CCNC: 106 U/L (ref 39–117)
ALT SERPL W P-5'-P-CCNC: 24 U/L (ref 1–41)
ANION GAP SERPL CALCULATED.3IONS-SCNC: 8.9 MMOL/L (ref 5–15)
AST SERPL-CCNC: 27 U/L (ref 1–40)
BASOPHILS # BLD AUTO: 0.03 10*3/MM3 (ref 0–0.2)
BASOPHILS NFR BLD AUTO: 0.2 % (ref 0–1.5)
BILIRUB SERPL-MCNC: 0.2 MG/DL (ref 0–1.2)
BUN SERPL-MCNC: 15 MG/DL (ref 6–20)
BUN/CREAT SERPL: 15.5 (ref 7–25)
CALCIUM SPEC-SCNC: 9.4 MG/DL (ref 8.6–10.5)
CHLORIDE SERPL-SCNC: 104 MMOL/L (ref 98–107)
CO2 SERPL-SCNC: 26.1 MMOL/L (ref 22–29)
CREAT SERPL-MCNC: 0.97 MG/DL (ref 0.76–1.27)
DEPRECATED RDW RBC AUTO: 42.5 FL (ref 37–54)
EOSINOPHIL # BLD AUTO: 0.04 10*3/MM3 (ref 0–0.4)
EOSINOPHIL NFR BLD AUTO: 0.3 % (ref 0.3–6.2)
ERYTHROCYTE [DISTWIDTH] IN BLOOD BY AUTOMATED COUNT: 13.4 % (ref 12.3–15.4)
GFR SERPL CREATININE-BSD FRML MDRD: 79 ML/MIN/1.73
GLOBULIN UR ELPH-MCNC: 2.8 GM/DL
GLUCOSE SERPL-MCNC: 201 MG/DL (ref 65–99)
HCT VFR BLD AUTO: 47.2 % (ref 37.5–51)
HGB BLD-MCNC: 15.5 G/DL (ref 13–17.7)
IMM GRANULOCYTES # BLD AUTO: 0.04 10*3/MM3 (ref 0–0.05)
IMM GRANULOCYTES NFR BLD AUTO: 0.3 % (ref 0–0.5)
LIPASE SERPL-CCNC: 64 U/L (ref 13–60)
LYMPHOCYTES # BLD AUTO: 2.21 10*3/MM3 (ref 0.7–3.1)
LYMPHOCYTES NFR BLD AUTO: 16.8 % (ref 19.6–45.3)
MCH RBC QN AUTO: 28.3 PG (ref 26.6–33)
MCHC RBC AUTO-ENTMCNC: 32.8 G/DL (ref 31.5–35.7)
MCV RBC AUTO: 86.1 FL (ref 79–97)
MONOCYTES # BLD AUTO: 0.95 10*3/MM3 (ref 0.1–0.9)
MONOCYTES NFR BLD AUTO: 7.2 % (ref 5–12)
NEUTROPHILS NFR BLD AUTO: 75.2 % (ref 42.7–76)
NEUTROPHILS NFR BLD AUTO: 9.87 10*3/MM3 (ref 1.7–7)
NRBC BLD AUTO-RTO: 0 /100 WBC (ref 0–0.2)
PLATELET # BLD AUTO: 245 10*3/MM3 (ref 140–450)
PMV BLD AUTO: 11.2 FL (ref 6–12)
POTASSIUM SERPL-SCNC: 4 MMOL/L (ref 3.5–5.2)
PROT SERPL-MCNC: 6.8 G/DL (ref 6–8.5)
RBC # BLD AUTO: 5.48 10*6/MM3 (ref 4.14–5.8)
SODIUM SERPL-SCNC: 139 MMOL/L (ref 136–145)
WBC # BLD AUTO: 13.14 10*3/MM3 (ref 3.4–10.8)

## 2021-05-25 PROCEDURE — 99283 EMERGENCY DEPT VISIT LOW MDM: CPT

## 2021-05-25 PROCEDURE — 83690 ASSAY OF LIPASE: CPT | Performed by: EMERGENCY MEDICINE

## 2021-05-25 PROCEDURE — 80053 COMPREHEN METABOLIC PANEL: CPT | Performed by: EMERGENCY MEDICINE

## 2021-05-25 PROCEDURE — 85025 COMPLETE CBC W/AUTO DIFF WBC: CPT | Performed by: EMERGENCY MEDICINE

## 2021-05-25 RX ORDER — LIDOCAINE HYDROCHLORIDE 20 MG/ML
15 SOLUTION OROPHARYNGEAL ONCE
Status: COMPLETED | OUTPATIENT
Start: 2021-05-25 | End: 2021-05-25

## 2021-05-25 RX ORDER — ALUMINA, MAGNESIA, AND SIMETHICONE 2400; 2400; 240 MG/30ML; MG/30ML; MG/30ML
15 SUSPENSION ORAL ONCE
Status: COMPLETED | OUTPATIENT
Start: 2021-05-25 | End: 2021-05-25

## 2021-05-25 RX ADMIN — ALUMINUM HYDROXIDE, MAGNESIUM HYDROXIDE, AND DIMETHICONE 15 ML: 400; 400; 40 SUSPENSION ORAL at 07:54

## 2021-05-25 RX ADMIN — LIDOCAINE HYDROCHLORIDE 15 ML: 20 SOLUTION ORAL; TOPICAL at 07:54

## 2021-05-25 NOTE — ED PROVIDER NOTES
Subjective   60-year-old male presenting with abdominal pain.  He states that for the last several months he has had intermittent abdominal pain, epigastric, comes and goes.  He has seen his primary doctor, has been in the ER and has seen GI.  Has had colonoscopy, EGD and CT scans.  Had been doing okay for the last little bit, over the last several days though has had increased pain.  He says after he eats he feels like his stomach is bloated.  Pain is moderate.  Pain is partially alleviated by antacids, antigas tablets, MiraLAX.  He denies any fevers, chills, vomiting or other complaints.          Review of Systems   Constitutional: Negative.    HENT: Negative.    Eyes: Negative.    Respiratory: Negative.    Cardiovascular: Negative.    Gastrointestinal: Positive for abdominal distention and abdominal pain.   Genitourinary: Negative.    Musculoskeletal: Negative.    Skin: Negative.    Neurological: Negative.    Psychiatric/Behavioral: Negative.        Past Medical History:   Diagnosis Date   • Hyperlipidemia    • Palpitations        Allergies   Allergen Reactions   • No Known Drug Allergy        Past Surgical History:   Procedure Laterality Date   • CHOLECYSTECTOMY     • KNEE SURGERY         History reviewed. No pertinent family history.    Social History     Socioeconomic History   • Marital status:      Spouse name: Not on file   • Number of children: Not on file   • Years of education: Not on file   • Highest education level: Not on file   Tobacco Use   • Smoking status: Former Smoker   Substance and Sexual Activity   • Alcohol use: No   • Drug use: No   • Sexual activity: Defer           Objective   Physical Exam  Vitals reviewed.   Constitutional:       General: He is not in acute distress.     Appearance: Normal appearance. He is not ill-appearing, toxic-appearing or diaphoretic.   HENT:      Head: Normocephalic and atraumatic.      Right Ear: External ear normal.      Left Ear: External ear normal.       Nose: Nose normal.      Mouth/Throat:      Mouth: Mucous membranes are moist.      Pharynx: Oropharynx is clear.   Eyes:      Extraocular Movements: Extraocular movements intact.      Conjunctiva/sclera: Conjunctivae normal.      Pupils: Pupils are equal, round, and reactive to light.   Cardiovascular:      Rate and Rhythm: Normal rate and regular rhythm.      Pulses: Normal pulses.      Heart sounds: Normal heart sounds.   Pulmonary:      Effort: Pulmonary effort is normal. No respiratory distress.      Breath sounds: Normal breath sounds.   Abdominal:      General: Bowel sounds are normal. There is no distension.      Tenderness: There is no abdominal tenderness.   Musculoskeletal:         General: No swelling, tenderness or deformity. Normal range of motion.      Cervical back: Normal range of motion and neck supple.   Skin:     General: Skin is warm and dry.      Capillary Refill: Capillary refill takes less than 2 seconds.      Findings: No rash.   Neurological:      General: No focal deficit present.      Mental Status: He is alert and oriented to person, place, and time.   Psychiatric:         Mood and Affect: Mood normal.         Behavior: Behavior normal.         Procedures           ED Course                                           MDM  Number of Diagnoses or Management Options  Upper abdominal pain  Diagnosis management comments: 60-year-old male with abdominal pain.  Well-developed, well-nourished man in no distress with exam as above.  His vital signs are normal.  His exam is fairly unremarkable including a benign abdominal exam.  Will check basic labs and give symptomatic treatment.  Given his previous work-up do not feel imaging is indicated at this point.  Disposition pending though anticipate discharge home.    DDx: Gastritis, GERD, constipation    Lab work is without any acute or significant abnormality.  Before I had a chance to update patient I was informed that he had eloped from the  emergency department.       Amount and/or Complexity of Data Reviewed  Decide to obtain previous medical records or to obtain history from someone other than the patient: yes        Final diagnoses:   Upper abdominal pain        Augustus Lopez MD  05/25/21 4865

## 2022-03-17 ENCOUNTER — OFFICE VISIT (OUTPATIENT)
Dept: NEUROLOGY | Facility: CLINIC | Age: 62
End: 2022-03-17

## 2022-03-17 VITALS
DIASTOLIC BLOOD PRESSURE: 80 MMHG | HEART RATE: 60 BPM | TEMPERATURE: 97.3 F | BODY MASS INDEX: 38.71 KG/M2 | SYSTOLIC BLOOD PRESSURE: 122 MMHG | WEIGHT: 246.6 LBS | HEIGHT: 67 IN | OXYGEN SATURATION: 96 %

## 2022-03-17 DIAGNOSIS — E66.9 DIABETES MELLITUS TYPE 2 IN OBESE: ICD-10-CM

## 2022-03-17 DIAGNOSIS — E11.69 DIABETES MELLITUS TYPE 2 IN OBESE: ICD-10-CM

## 2022-03-17 DIAGNOSIS — G47.33 OBSTRUCTIVE SLEEP APNEA: Primary | ICD-10-CM

## 2022-03-17 DIAGNOSIS — I10 ESSENTIAL HYPERTENSION: ICD-10-CM

## 2022-03-17 DIAGNOSIS — G43.719 INTRACTABLE CHRONIC MIGRAINE WITHOUT AURA AND WITHOUT STATUS MIGRAINOSUS: ICD-10-CM

## 2022-03-17 DIAGNOSIS — F41.9 ANXIETY DISORDER, UNSPECIFIED TYPE: ICD-10-CM

## 2022-03-17 PROCEDURE — 99243 OFF/OP CNSLTJ NEW/EST LOW 30: CPT | Performed by: NURSE PRACTITIONER

## 2022-03-17 RX ORDER — LISINOPRIL AND HYDROCHLOROTHIAZIDE 25; 20 MG/1; MG/1
1 TABLET ORAL DAILY
COMMUNITY

## 2022-03-17 RX ORDER — METOPROLOL TARTRATE 50 MG/1
50 TABLET, FILM COATED ORAL 2 TIMES DAILY
COMMUNITY

## 2022-03-17 RX ORDER — RIZATRIPTAN BENZOATE 10 MG/1
10 TABLET ORAL ONCE AS NEEDED
Qty: 9 TABLET | Refills: 3 | Status: SHIPPED | OUTPATIENT
Start: 2022-03-17

## 2022-03-17 RX ORDER — QUETIAPINE FUMARATE 50 MG/1
50 TABLET, FILM COATED ORAL NIGHTLY
COMMUNITY

## 2022-03-17 RX ORDER — RIMEGEPANT SULFATE 75 MG/75MG
75 TABLET, ORALLY DISINTEGRATING ORAL DAILY
Qty: 4 TABLET | Refills: 0 | COMMUNITY
Start: 2022-03-17

## 2022-03-17 NOTE — PROGRESS NOTES
New Sleep Patient Office Visit      Patient Name: Irineo Jewell  : 1960   MRN: [id number]     Referring Physician: Isaak Silva MD    Chief Complaint:    Chief Complaint   Patient presents with   • Consult     NP, in office to establish care for serena.          History of Present Illness: Irineo Jewell is a 61 y.o. male who is here today to establish care with Sleep Medicine.  He was diagnosed with SERENA around  (in [place], [place]).  He is on PAP therapy (he is not sure what his settings are but thinks it is AutoPap 10-15cm), but has not been using his machine much over the past year.  He says his compliance is due to chronic sinus congestion.  He is not sleeping well.  He is having difficulty falling asleep and staying asleep.  He is having headaches and anxiety.  He was prescribed Seroquel 25mg QHS, for sleep, yesterday (he had previously taken 50mg QHS and that did help with sleep).  He would like to have a repeat sleep study.  Additional risk factors- BMI 38, HTN, DM, mood disorder- anxiety disorder and depressive disorder, migraines, RA and OA.     Migraines- He has had frequent headaches and migraines since his 20s.  His migraines are associated with photophobia and last more than 4 hours.  He is taking Sumatriptan PRN and it is not helping much.  He is also taking Acetaminophen, Ibuprofen and Excedrin PRN.  He does say he is under an increased amount of stress, in his personal life, and this could be contributing to his worsening headaches.  He has had trigger point injections in the past for his migraines and myofascial pain.    Pertinent Medical History:   Current compliance report has been requested from [employer].     Subjective      Review of Systems:   Review of Systems   Constitutional: Negative for fatigue, fever, unexpected weight gain and unexpected weight loss.   HENT: Negative for hearing loss, sore throat, swollen glands, tinnitus and trouble swallowing.     Eyes: Negative for blurred vision, double vision, photophobia and visual disturbance.   Respiratory: Positive for apnea. Negative for cough, chest tightness and shortness of breath.    Cardiovascular: Negative for chest pain, palpitations and leg swelling.   Gastrointestinal: Negative for constipation, diarrhea and nausea.   Endocrine: Negative for cold intolerance and heat intolerance.   Musculoskeletal: Negative for gait problem, neck pain and neck stiffness.   Skin: Negative for color change and rash.   Allergic/Immunologic: Negative for environmental allergies and food allergies.   Neurological: Positive for headache. Negative for dizziness, syncope, facial asymmetry, speech difficulty, weakness, memory problem and confusion.   Psychiatric/Behavioral: Positive for sleep disturbance, depressed mood and stress. Negative for agitation, behavioral problems and suicidal ideas. The patient is nervous/anxious.        Past Medical History:   Past Medical History:   Diagnosis Date   • Hyperlipidemia    • Palpitations        Past Surgical History:   Past Surgical History:   Procedure Laterality Date   • CHOLECYSTECTOMY     • KNEE SURGERY         Family History: History reviewed. No pertinent family history.    Social History:   Social History     Socioeconomic History   • Marital status:    Tobacco Use   • Smoking status: Former Smoker   • Smokeless tobacco: Never Used   Substance and Sexual Activity   • Alcohol use: No   • Drug use: No   • Sexual activity: Defer       Medications:     Current Outpatient Medications:   •  allopurinol (ZYLOPRIM) 100 MG tablet, Take 200 mg by mouth Daily., Disp: , Rfl: 6  •  ARNUITY ELLIPTA 100 MCG/ACT aerosol powder , INHALE 1 PUFF DAILY. RINSE MOUTH AFTER EACH USE, Disp: , Rfl: 11  •  Azelastine-Fluticasone 137-50 MCG/ACT suspension, into each nostril., Disp: , Rfl:   •  busPIRone (BUSPAR) 10 MG tablet, Take 10 mg by mouth 3 (Three) Times a Day., Disp: , Rfl: 1  •  cetirizine  "(zyrTEC) 10 MG tablet, TAKE 1 TABLET (10 MG) BY ORAL ROUTE ONCE DAILY, Disp: , Rfl: 1  •  finasteride (PROSCAR) 5 MG tablet, , Disp: , Rfl:   •  lisinopril (PRINIVIL,ZESTRIL) 10 MG tablet, Take 10 mg by mouth Daily., Disp: , Rfl: 2  •  lisinopril-hydrochlorothiazide (PRINZIDE,ZESTORETIC) 20-25 MG per tablet, Take 1 tablet by mouth Daily., Disp: , Rfl:   •  metFORMIN (GLUCOPHAGE) 500 MG tablet, Take 500 mg by mouth Daily., Disp: , Rfl: 4  •  metoprolol tartrate (LOPRESSOR) 50 MG tablet, Take 50 mg by mouth 2 (Two) Times a Day., Disp: , Rfl:   •  montelukast (SINGULAIR) 10 MG tablet, , Disp: , Rfl:   •  polyethylene glycol (MIRALAX) powder, TAKE 17 GRAM EVERY DAY WITH WATER, Disp: , Rfl: 5  •  QUEtiapine (SEROquel) 50 MG tablet, Take 50 mg by mouth Every Night., Disp: , Rfl:   •  sertraline (ZOLOFT) 50 MG tablet, Take 50 mg by mouth Daily., Disp: , Rfl: 5  •  Rimegepant Sulfate (Nurtec) 75 MG tablet dispersible tablet, Take 1 tablet by mouth Daily., Disp: 4 tablet, Rfl: 0  •  rizatriptan (Maxalt) 10 MG tablet, Take 1 tablet by mouth 1 (One) Time As Needed for Migraine. May repeat in 2 hours if needed, Disp: 9 tablet, Rfl: 3  •  ubrogepant (ubrogepant) 100 MG tablet, Take 1 tablet by mouth 1 (One) Time for 1 dose., Disp: 4 tablet, Rfl: 0    Allergies:   Allergies   Allergen Reactions   • No Known Drug Allergy        Objective     Physical Exam:  Vital Signs:   Vitals:    03/17/22 1408   BP: 122/80   BP Location: Right arm   Patient Position: Sitting   Cuff Size: Adult   Pulse: 60   Temp: 97.3 °F (36.3 °C)   SpO2: 96%   Weight: 112 kg (246 lb 9.6 oz)   Height: 170.2 cm (67\")   PainSc: 0-No pain     BMI: Body mass index is 38.62 kg/m².  Neck Circumference:  17in    Physical Exam  Vitals and nursing note reviewed.   Constitutional:       General: He is not in acute distress.     Appearance: He is well-developed. He is obese. He is not diaphoretic.   HENT:      Head: Normocephalic and atraumatic.      Comments: Mallampati " 4  Eyes:      Extraocular Movements: Extraocular movements intact.      Conjunctiva/sclera: Conjunctivae normal.      Pupils: Pupils are equal, round, and reactive to light.   Neck:      Trachea: Trachea normal.   Cardiovascular:      Rate and Rhythm: Normal rate and regular rhythm.      Heart sounds: Normal heart sounds. No murmur heard.    No friction rub. No gallop.   Pulmonary:      Effort: Pulmonary effort is normal. No respiratory distress.      Breath sounds: Normal breath sounds. No wheezing or rales.   Musculoskeletal:         General: Normal range of motion.   Skin:     General: Skin is warm and dry.      Findings: No rash.   Neurological:      Mental Status: He is alert and oriented to person, place, and time.   Psychiatric:         Mood and Affect: Mood normal.         Behavior: Behavior normal.         Thought Content: Thought content normal.         Judgment: Judgment normal.         Assessment / Plan      Assessment/Plan:   Diagnoses and all orders for this visit:    1. Obstructive sleep apnea (Primary)  -     Home Sleep Study; Future    2. Essential hypertension  -     Home Sleep Study; Future    3. Anxiety disorder, unspecified type  -     Home Sleep Study; Future    4. Diabetes mellitus type 2 in obese (HCC)  -     Home Sleep Study; Future    5. Intractable chronic migraine without aura and without status migrainosus  -     rizatriptan (Maxalt) 10 MG tablet; Take 1 tablet by mouth 1 (One) Time As Needed for Migraine. May repeat in 2 hours if needed  Dispense: 9 tablet; Refill: 3    6. BMI 38.0-38.9,adult  -     Home Sleep Study; Future    Other orders  -     ubrogepant (ubrogepant) 100 MG tablet; Take 1 tablet by mouth 1 (One) Time for 1 dose.  Dispense: 4 tablet; Refill: 0  -     Rimegepant Sulfate (Nurtec) 75 MG tablet dispersible tablet; Take 1 tablet by mouth Daily.  Dispense: 4 tablet; Refill: 0    *Printed patient education on KIRTI and Migraines provided today. Indications and possible SEs of  Nurtec, Ubrelvy, and Rizatriptan.   *Offered PSG but patient prefers a home sleep study.      Follow Up:   Return in about 2 months (around 5/17/2022) for Recheck.    I have advised the patient the need to continue the use of CPAP.  Gold standard for treatment of sleep apnea includes weight loss, use of cpap and avoidance of alcohol.  Untreated KIRTI may increase the risk for development of hypertension, stroke, myocardial infarction, diabetes, cardiovascular disease, work-related issues and driving accidents. I have counseled and advised the patient to avoid driving or operating heavy/dangerous equipment if feeling drowsy.     LATOYA Lan, FNP-C  [facility] Neurology and Sleep Medicine       Please note that portions of this note may have been completed with a voice recognition program. Efforts were made to edit the dictations, but occasionally words are mistranscribed.

## 2022-03-21 DIAGNOSIS — E11.69 DIABETES MELLITUS TYPE 2 IN OBESE: ICD-10-CM

## 2022-03-21 DIAGNOSIS — G47.33 OBSTRUCTIVE SLEEP APNEA: Primary | ICD-10-CM

## 2022-03-21 DIAGNOSIS — I10 ESSENTIAL HYPERTENSION: ICD-10-CM

## 2022-03-21 DIAGNOSIS — E66.9 DIABETES MELLITUS TYPE 2 IN OBESE: ICD-10-CM

## 2022-03-21 DIAGNOSIS — G43.719 INTRACTABLE CHRONIC MIGRAINE WITHOUT AURA AND WITHOUT STATUS MIGRAINOSUS: ICD-10-CM

## 2022-04-12 ENCOUNTER — APPOINTMENT (OUTPATIENT)
Dept: SLEEP MEDICINE | Facility: HOSPITAL | Age: 62
End: 2022-04-12

## 2022-06-17 ENCOUNTER — TRANSCRIBE ORDERS (OUTPATIENT)
Dept: ADMINISTRATIVE | Facility: HOSPITAL | Age: 62
End: 2022-06-17

## 2022-06-17 DIAGNOSIS — E11.9 TYPE 2 DIABETES MELLITUS WITHOUT COMPLICATION, WITHOUT LONG-TERM CURRENT USE OF INSULIN: Primary | ICD-10-CM

## 2024-04-09 DIAGNOSIS — G43.719 INTRACTABLE CHRONIC MIGRAINE WITHOUT AURA AND WITHOUT STATUS MIGRAINOSUS: ICD-10-CM

## 2024-04-10 RX ORDER — RIMEGEPANT SULFATE 75 MG/75MG
75 TABLET, ORALLY DISINTEGRATING ORAL DAILY
Qty: 4 TABLET | Refills: 0 | COMMUNITY
Start: 2024-04-10

## 2024-04-10 RX ORDER — RIZATRIPTAN BENZOATE 10 MG/1
10 TABLET ORAL ONCE AS NEEDED
Qty: 9 TABLET | Refills: 3 | Status: SHIPPED | OUTPATIENT
Start: 2024-04-10

## 2024-05-14 ENCOUNTER — OFFICE VISIT (OUTPATIENT)
Dept: NEUROLOGY | Facility: CLINIC | Age: 64
End: 2024-05-14
Payer: COMMERCIAL

## 2024-05-14 VITALS
TEMPERATURE: 97.3 F | OXYGEN SATURATION: 96 % | DIASTOLIC BLOOD PRESSURE: 64 MMHG | HEIGHT: 67 IN | SYSTOLIC BLOOD PRESSURE: 126 MMHG | HEART RATE: 65 BPM | WEIGHT: 248.6 LBS | BODY MASS INDEX: 39.02 KG/M2

## 2024-05-14 DIAGNOSIS — G43.009 MIGRAINE WITHOUT AURA AND WITHOUT STATUS MIGRAINOSUS, NOT INTRACTABLE: ICD-10-CM

## 2024-05-14 DIAGNOSIS — G43.719 INTRACTABLE CHRONIC MIGRAINE WITHOUT AURA AND WITHOUT STATUS MIGRAINOSUS: Primary | ICD-10-CM

## 2024-05-14 DIAGNOSIS — G47.33 OSA (OBSTRUCTIVE SLEEP APNEA): ICD-10-CM

## 2024-05-14 DIAGNOSIS — R40.4 TRANSIENT ALTERATION OF AWARENESS: ICD-10-CM

## 2024-05-14 DIAGNOSIS — G47.09 OTHER INSOMNIA: ICD-10-CM

## 2024-05-14 DIAGNOSIS — I10 ESSENTIAL HYPERTENSION: ICD-10-CM

## 2024-05-14 PROCEDURE — 99215 OFFICE O/P EST HI 40 MIN: CPT | Performed by: NURSE PRACTITIONER

## 2024-05-14 PROCEDURE — 96372 THER/PROPH/DIAG INJ SC/IM: CPT | Performed by: NURSE PRACTITIONER

## 2024-05-14 RX ORDER — ATORVASTATIN CALCIUM 40 MG/1
TABLET, FILM COATED ORAL
COMMUNITY
Start: 2024-04-01

## 2024-05-14 RX ORDER — FREMANEZUMAB-VFRM 225 MG/1.5ML
225 INJECTION SUBCUTANEOUS
Qty: 1.68 ML | Refills: 5 | Status: SHIPPED | OUTPATIENT
Start: 2024-05-14

## 2024-05-14 RX ORDER — TRAZODONE HYDROCHLORIDE 50 MG/1
50 TABLET ORAL NIGHTLY
COMMUNITY

## 2024-05-14 RX ORDER — ZAVEGEPANT 10 MG/.1ML
10 SPRAY NASAL AS NEEDED
Qty: 9 EACH | Refills: 3 | Status: SHIPPED | OUTPATIENT
Start: 2024-05-14

## 2024-05-14 NOTE — PROGRESS NOTES
Follow Up Office Visit      Patient Name: Irineo Jewlel  : 1960   MRN: [id number]     Chief Complaint:    Chief Complaint   Patient presents with    Follow-up     Patient in office to follow up on migraines. Patient stated he is having about 3-4 migraines a month. Patient stated he wakes up every day with a headache.  Patient stated he is using his cpap machine at least 4 times a week.        History of Present Illness: Irineo Jewell is a 63 y.o. male who is here today to follow up with migraine and KIRTI and was last seen on 3/17/2022.  Says he is using his CPAP machine about 4 days per week.  He is having difficulty tolerating his CPAP therapy due to it causing pretty severe congestion.  A home sleep study was ordered at his previous visit but that was never done, due to insurance issues.  He has had 20-25/30 headache days in the past month, with about 7 being migraine days.  He is awakening with a headache frequently, whether he uses the CPAP or not.  He was taking Seroquel for sleep but made him very drowsy the next day.  Now taking Trazodone 50mg QHS for sleep.  He is taking Rizatriptan and Iburofen PRN migraine.  Migraines described as stabbing, temporal and radiate around the head and to the top of the head, accompanied by dizziness and photophobia at times, lasting more than 4 hours.  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  The episode of transient alteration of awareness lasted for less than a minute, there was no confusion or sleepiness after the episode.  He was able to pick back up where he left off.  He did have some lightheadedness on the day of one of the episodes.  He mentions he was not sleeping well during the episode.    *Current compliance report has been requested from DME for review.   *Medications taken from migraines- Sumatriptan, Rizatriptan, Ubrelvy, Nurtec, Amitriptyline,  Topiramate, Metoprolol.     Following taken from previous visit note:  Irineo Jewell is a 61 y.o. male who is here today to establish care with Sleep Medicine.  He was diagnosed with KIRTI around 2014 (in [place], [place]).  He is on PAP therapy (he is not sure what his settings are but thinks it is AutoPap 10-15cm), but has not been using his machine much over the past year.  He says his compliance is due to chronic sinus congestion.  He is not sleeping well.  He is having difficulty falling asleep and staying asleep.  He is having headaches and anxiety.  He was prescribed Seroquel 25mg QHS, for sleep, yesterday (he had previously taken 50mg QHS and that did help with sleep).  He would like to have a repeat sleep study.  Additional risk factors- BMI 38, HTN, DM, mood disorder- anxiety disorder and depressive disorder, migraines, RA and OA.      Migraines- He has had frequent headaches and migraines since his 20s.  His migraines are associated with photophobia and last more than 4 hours.  He is taking Sumatriptan PRN and it is not helping much.  He is also taking Acetaminophen, Ibuprofen and Excedrin PRN.  He does say he is under an increased amount of stress, in his personal life, and this could be contributing to his worsening headaches.  He has had trigger point injections in the past for his migraines and myofascial pain.     Subjective      Review of Systems:   Review of Systems   Neurological:  Positive for headache and confusion.   Psychiatric/Behavioral:  Positive for sleep disturbance.        I have reviewed and the following portions of the patient's history were updated as appropriate: past family history, past medical history, past social history, past surgical history and problem list.    Medications:     Current Outpatient Medications:     allopurinol (ZYLOPRIM) 100 MG tablet, Take 2 tablets by mouth Daily., Disp: , Rfl: 6    atorvastatin (LIPITOR) 40 MG tablet, , Disp: , Rfl:      "Azelastine-Fluticasone 137-50 MCG/ACT suspension, into each nostril., Disp: , Rfl:     busPIRone (BUSPAR) 10 MG tablet, Take 1.5 tablets by mouth 3 (Three) Times a Day., Disp: , Rfl: 1    cetirizine (zyrTEC) 10 MG tablet, TAKE 1 TABLET (10 MG) BY ORAL ROUTE ONCE DAILY, Disp: , Rfl: 1    finasteride (PROSCAR) 5 MG tablet, , Disp: , Rfl:     lisinopril-hydrochlorothiazide (PRINZIDE,ZESTORETIC) 20-25 MG per tablet, Take 1 tablet by mouth Daily., Disp: , Rfl:     metoprolol tartrate (LOPRESSOR) 50 MG tablet, Take 1 tablet by mouth 2 (Two) Times a Day., Disp: , Rfl:     polyethylene glycol (MIRALAX) powder, TAKE 17 GRAM EVERY DAY WITH WATER, Disp: , Rfl: 5    rizatriptan (Maxalt) 10 MG tablet, Take 1 tablet by mouth 1 (One) Time As Needed for Migraine. May repeat in 2 hours if needed, Disp: 9 tablet, Rfl: 3    sertraline (ZOLOFT) 50 MG tablet, Take 1 tablet by mouth Daily., Disp: , Rfl: 5    Fremanezumab-vfrm (Ajovy) 225 MG/1.5ML solution auto-injector, Inject 225 mg under the skin into the appropriate area as directed Every 30 (Thirty) Days., Disp: 1.68 mL, Rfl: 5    Fremanezumab-vfrm 225 MG/1.5ML solution prefilled syringe, Inject 225 mg under the skin into the appropriate area as directed Every 28 (Twenty-Eight) Days., Disp: 1.68 mL, Rfl: 0    traZODone (DESYREL) 50 MG tablet, Take 1 tablet by mouth Every Night. Indications: Trouble Sleeping, Disp: , Rfl:     Zavegepant HCl (Zavzpret) 10 MG/ACT solution, 10 mg into the nostril(s) as directed by provider As Needed (migraine)., Disp: 9 each, Rfl: 3  No current facility-administered medications for this visit.    Allergies:   Allergies   Allergen Reactions    No Known Drug Allergy        Objective     Physical Exam:  Vital Signs:   Vitals:    05/14/24 1115   BP: 126/64   BP Location: Right arm   Patient Position: Sitting   Cuff Size: Adult   Pulse: 65   Temp: 97.3 °F (36.3 °C)   SpO2: 96%   Weight: 113 kg (248 lb 9.6 oz)   Height: 170.2 cm (67\")   PainSc: 0-No pain "     Body mass index is 38.94 kg/m².    Physical Exam  Vitals and nursing note reviewed.   Constitutional:       General: He is not in acute distress.     Appearance: Normal appearance. He is well-developed. He is obese. He is not diaphoretic.   HENT:      Head: Normocephalic and atraumatic.   Eyes:      Extraocular Movements: Extraocular movements intact.      Conjunctiva/sclera: Conjunctivae normal.      Pupils: Pupils are equal, round, and reactive to light.   Pulmonary:      Effort: Pulmonary effort is normal. No respiratory distress.   Musculoskeletal:         General: Normal range of motion.   Skin:     General: Skin is warm and dry.   Neurological:      Mental Status: He is alert and oriented to person, place, and time.      Cranial Nerves: Cranial nerves 2-12 are intact.      Coordination: Finger-Nose-Finger Test normal.      Gait: Gait is intact.   Psychiatric:         Mood and Affect: Mood normal.         Behavior: Behavior normal.         Thought Content: Thought content normal.         Judgment: Judgment normal.         Neurologic Exam     Mental Status   Oriented to person, place, and time.   Level of consciousness: alert    Cranial Nerves   Cranial nerves II through XII intact.     CN II   Visual fields full to confrontation.     CN III, IV, VI   Pupils are equal, round, and reactive to light.  CN III: no CN III palsy  CN VI: no CN VI palsy  Nystagmus: none     CN V   Facial sensation intact.     CN VII   Facial expression full, symmetric.     CN IX, X   CN IX normal.   CN X normal.     CN XI   CN XI normal.     CN XII   CN XII normal.     Motor Exam   Muscle bulk: normal  Overall muscle tone: normal    Strength   Right biceps: 5/5  Left biceps: 5/5  Right triceps: 5/5  Left triceps: 5/5  Right quadriceps: 5/5  Left quadriceps: 5/5  Right hamstrin/5  Left hamstrin/5    Sensory Exam   Light touch normal.   Proprioception normal.     Gait, Coordination, and Reflexes     Gait  Gait:  normal    Coordination   Finger to nose coordination: normal    Tremor   Resting tremor: absent  Intention tremor: absent  Action tremor: absent       Assessment / Plan      Assessment/Plan:   Diagnoses and all orders for this visit:    1. Intractable chronic migraine without aura and without status migrainosus (Primary)  -     Home Sleep Study; Future  -     MRI Brain With & Without Contrast; Future  -     Fremanezumab-vfrm (Ajovy) 225 MG/1.5ML solution auto-injector; Inject 225 mg under the skin into the appropriate area as directed Every 30 (Thirty) Days.  Dispense: 1.68 mL; Refill: 5  -     Fremanezumab-vfrm 225 MG/1.5ML solution prefilled syringe; Inject 225 mg under the skin into the appropriate area as directed Every 28 (Twenty-Eight) Days.  Dispense: 1.68 mL; Refill: 0    2. KIRTI (obstructive sleep apnea)  -     Home Sleep Study; Future    3. Transient alteration of awareness  -     Home Sleep Study; Future  -     MRI Brain With & Without Contrast; Future  -     EEG; Future    4. Essential hypertension  -     Home Sleep Study; Future    5. Other insomnia    6. Migraine without aura and without status migrainosus, not intractable  -     Zavegepant HCl (Zavzpret) 10 MG/ACT solution; 10 mg into the nostril(s) as directed by provider As Needed (migraine).  Dispense: 9 each; Refill: 3    7. BMI 38.0-38.9,adult  -     Home Sleep Study; Future    *Patient education on KIRTI, TIA, Insomnia, and Migraines provided today.  Home sleep study ordered. I have discussed the implications of untreated KIRTI in regards to cardiovascular health and risk for stroke. Advised patient to avoid driving if drowsy. Will review compliance report once received from DME.   *I have advised patient to call 911 or go to the ED for any signs or symptoms of stroke.   *LDL goal <70. Taking Atorvastatin 40mg daily.   *Encouraged weight loss with a BMI goal <24.   *Indications and possible SEs of Ajovy and Zavzpret discussed with patient.     *I  spent 43 minutes on this visit including but not limited to- interview, physical examination, prior documentation review, , documentation, providing patient education on medications and conditions.     Follow Up:   Return in about 3 months (around 8/14/2024) for Follow Up.    LATOYA Lan, FNP-C  [facility] Neurology and Sleep Medicine

## 2024-05-22 ENCOUNTER — TELEPHONE (OUTPATIENT)
Dept: NEUROLOGY | Facility: CLINIC | Age: 64
End: 2024-05-22
Payer: COMMERCIAL

## 2024-05-22 NOTE — TELEPHONE ENCOUNTER
According to my note, he told me he was taking Seroquel but stopped that because it made him too drowsy the day after. So, I am very confused at this point.

## 2024-05-22 NOTE — TELEPHONE ENCOUNTER
Caller: Irineo Jewell    Relationship: Self    Best call back number: [phone]    Who are you requesting to speak with (clinical staff, provider,  specific staff member): CLINICAL    What was the call regarding: PATIENT WAS TOLD ON HIS LAST VISIT THAT HE SHOULD START TAKING SEROQUEL 50 MG. HE ASKS TO HAVE THAT SENT TO [address] [phone] [facility] [phone] FX       PATIENT STATED HE IS TO STOP TAKING THE TRAZODONE TO START THE SEROQUEL. HE WANTS TO KNOW IF IT IS JUST A SWITCH OR IF HE IS TO TAPER OFF ONE TO START THE NEXT.     PLEASE REVIEW  THANK YOU

## 2024-07-08 DIAGNOSIS — N48.29 INFECTION OF PENIS: Primary | ICD-10-CM

## 2024-07-08 RX ORDER — AMOXICILLIN 500 MG/1
CAPSULE ORAL
Qty: 20 CAPSULE | Refills: 0 | Status: SHIPPED | OUTPATIENT
Start: 2024-07-08

## 2024-07-11 ENCOUNTER — HOSPITAL ENCOUNTER (OUTPATIENT)
Dept: MRI IMAGING | Facility: HOSPITAL | Age: 64
Discharge: HOME | End: 2024-07-11
Admitting: NURSE PRACTITIONER
Payer: COMMERCIAL

## 2024-07-11 DIAGNOSIS — G43.719 INTRACTABLE CHRONIC MIGRAINE WITHOUT AURA AND WITHOUT STATUS MIGRAINOSUS: ICD-10-CM

## 2024-07-11 DIAGNOSIS — R40.4 TRANSIENT ALTERATION OF AWARENESS: ICD-10-CM

## 2024-07-11 PROCEDURE — A9577 INJ MULTIHANCE: HCPCS | Performed by: NURSE PRACTITIONER

## 2024-07-11 PROCEDURE — 70553 MRI BRAIN STEM W/O & W/DYE: CPT

## 2024-07-11 PROCEDURE — 0 GADOBENATE DIMEGLUMINE 529 MG/ML SOLUTION: Performed by: NURSE PRACTITIONER

## 2024-07-11 RX ADMIN — GADOBENATE DIMEGLUMINE 23 ML: 529 INJECTION, SOLUTION INTRAVENOUS at 07:17

## 2024-07-12 ENCOUNTER — TELEPHONE (OUTPATIENT)
Dept: NEUROLOGY | Facility: CLINIC | Age: 64
End: 2024-07-12
Payer: COMMERCIAL

## 2024-07-12 ENCOUNTER — DOCUMENTATION (OUTPATIENT)
Dept: NEUROLOGY | Facility: CLINIC | Age: 64
End: 2024-07-12
Payer: COMMERCIAL

## 2024-07-12 DIAGNOSIS — G43.719 INTRACTABLE CHRONIC MIGRAINE WITHOUT AURA AND WITHOUT STATUS MIGRAINOSUS: ICD-10-CM

## 2024-07-12 DIAGNOSIS — R40.4 TRANSIENT ALTERATION OF AWARENESS: ICD-10-CM

## 2024-07-12 DIAGNOSIS — R90.89 ABNORMAL BRAIN MRI: Primary | ICD-10-CM

## 2024-07-12 RX ORDER — FREMANEZUMAB-VFRM 225 MG/1.5ML
225 INJECTION SUBCUTANEOUS
Qty: 1.68 ML | Refills: 5 | Status: CANCELLED | OUTPATIENT
Start: 2024-07-12

## 2024-07-12 NOTE — TELEPHONE ENCOUNTER
Called to discuss results of patient's brain MRI and had to leave a voicemail. Will await return call.

## 2024-07-12 NOTE — TELEPHONE ENCOUNTER
Patient returned phone call and I have discussed the results of his recent brain MRI. I have advised him I have consulted with Dr. 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 I have ordered a referral to the stroke clinic, echocardiogram, carotid ultrasound, lipid panel, and provided patient with stroke prevention education- LDL goal <70 and good compliance with CPAP therapy (at least 5 hours of usage per night). Patient verbalizes understanding and is agreeable.

## 2024-07-12 NOTE — PROGRESS NOTES
Received return phone call from patient's PCP, LATOYA Bain at [facility]. I have updated her on plan of care for patient regarding his recent abnormal brain MRI. She is appreciative for the update.

## 2024-07-12 NOTE — TELEPHONE ENCOUNTER
Caller: Irineo Jewell    Relationship: Self    Best call back number: [phone] -PLEASE LEAVE DETAILED VM IF UNABLE TO REACH PT DIRECTLY.    What was the call regarding: I HAVE ADVISED PT THAT [facility] SHOULD HAVE AJOVY REFILLS ON FILE. HE VERBALIZED UNDERSTANDING.    PT STATES HE IS 2 DAYS PAST DUE FOR HIS AJOVY INJECTION AND IS WAITING FOR THE PHARMACY TO LOCATE THE SCRIPT (HE WAS ALSO ADVISED THAT IT MAY REQUIRE A PA), THEREFORE, PT ASKS IF OFFICE HAS A AJOVY SAMPLE TO PROVIDE HIM IN THE MEANTIME.    Do you require a callback: YES, PLEASE.    PLEASE REVIEW AND ADVISE.

## 2024-07-16 ENCOUNTER — HOSPITAL ENCOUNTER (OUTPATIENT)
Dept: SLEEP MEDICINE | Facility: HOSPITAL | Age: 64
Discharge: HOME | End: 2024-07-16
Admitting: NURSE PRACTITIONER
Payer: COMMERCIAL

## 2024-07-16 DIAGNOSIS — I10 ESSENTIAL HYPERTENSION: ICD-10-CM

## 2024-07-16 DIAGNOSIS — G43.719 INTRACTABLE CHRONIC MIGRAINE WITHOUT AURA AND WITHOUT STATUS MIGRAINOSUS: ICD-10-CM

## 2024-07-16 DIAGNOSIS — G47.33 OSA (OBSTRUCTIVE SLEEP APNEA): ICD-10-CM

## 2024-07-16 DIAGNOSIS — R40.4 TRANSIENT ALTERATION OF AWARENESS: ICD-10-CM

## 2024-07-16 PROCEDURE — 95806 SLEEP STUDY UNATT&RESP EFFT: CPT

## 2024-07-18 ENCOUNTER — PATIENT MESSAGE (OUTPATIENT)
Dept: NEUROLOGY | Facility: CLINIC | Age: 64
End: 2024-07-18
Payer: COMMERCIAL

## 2024-07-18 NOTE — TELEPHONE ENCOUNTER
From: Irineo Jewell  To: Francine Reveles  Sent: 7/18/2024 3:26 PM EDT  Subject: Sleep Apnea test    Vincent Escamilla, I want to know the results of the sleep study and if I need something like cpap or new kind of michine.  And want to let you know that I still have headaches a little bit more frequently.  Thanks

## 2024-07-23 PROCEDURE — 95806 SLEEP STUDY UNATT&RESP EFFT: CPT | Performed by: INTERNAL MEDICINE

## 2024-08-01 ENCOUNTER — APPOINTMENT (OUTPATIENT)
Dept: GENERAL RADIOLOGY | Facility: HOSPITAL | Age: 64
End: 2024-08-01
Payer: COMMERCIAL

## 2024-08-01 ENCOUNTER — APPOINTMENT (OUTPATIENT)
Dept: CT IMAGING | Facility: HOSPITAL | Age: 64
End: 2024-08-01
Payer: COMMERCIAL

## 2024-08-01 ENCOUNTER — HOSPITAL ENCOUNTER (EMERGENCY)
Facility: HOSPITAL | Age: 64
Discharge: HOME | End: 2024-08-01
Attending: STUDENT IN AN ORGANIZED HEALTH CARE EDUCATION/TRAINING PROGRAM
Payer: COMMERCIAL

## 2024-08-01 VITALS
TEMPERATURE: 98.6 F | BODY MASS INDEX: 39.24 KG/M2 | WEIGHT: 250 LBS | OXYGEN SATURATION: 95 % | HEART RATE: 56 BPM | DIASTOLIC BLOOD PRESSURE: 78 MMHG | HEIGHT: 67 IN | RESPIRATION RATE: 18 BRPM | SYSTOLIC BLOOD PRESSURE: 123 MMHG

## 2024-08-01 DIAGNOSIS — U07.1 COVID-19: Primary | ICD-10-CM

## 2024-08-01 LAB
ALBUMIN SERPL-MCNC: 4.4 G/DL (ref 3.5–5.2)
ALBUMIN/GLOB SERPL: 1.6 G/DL
ALP SERPL-CCNC: 105 U/L (ref 39–117)
ALT SERPL W P-5'-P-CCNC: 23 U/L (ref 1–41)
ANION GAP SERPL CALCULATED.3IONS-SCNC: 11.4 MMOL/L (ref 5–15)
AST SERPL-CCNC: 21 U/L (ref 1–40)
BASOPHILS # BLD AUTO: 0.05 10*3/MM3 (ref 0–0.2)
BASOPHILS NFR BLD AUTO: 0.6 % (ref 0–1.5)
BILIRUB SERPL-MCNC: 0.4 MG/DL (ref 0–1.2)
BUN SERPL-MCNC: 10 MG/DL (ref 8–23)
BUN/CREAT SERPL: 8.7 (ref 7–25)
CALCIUM SPEC-SCNC: 8.9 MG/DL (ref 8.6–10.5)
CHLORIDE SERPL-SCNC: 103 MMOL/L (ref 98–107)
CO2 SERPL-SCNC: 26.6 MMOL/L (ref 22–29)
CREAT SERPL-MCNC: 1.15 MG/DL (ref 0.76–1.27)
CRP SERPL-MCNC: <0.3 MG/DL (ref 0–0.5)
DEPRECATED RDW RBC AUTO: 47.5 FL (ref 37–54)
EGFRCR SERPLBLD CKD-EPI 2021: 72.4 ML/MIN/1.73
EOSINOPHIL # BLD AUTO: 0.17 10*3/MM3 (ref 0–0.4)
EOSINOPHIL NFR BLD AUTO: 2.1 % (ref 0.3–6.2)
ERYTHROCYTE [DISTWIDTH] IN BLOOD BY AUTOMATED COUNT: 14.6 % (ref 12.3–15.4)
FLUAV SUBTYP SPEC NAA+PROBE: NOT DETECTED
FLUBV RNA ISLT QL NAA+PROBE: NOT DETECTED
GLOBULIN UR ELPH-MCNC: 2.8 GM/DL
GLUCOSE SERPL-MCNC: 135 MG/DL (ref 65–99)
HCT VFR BLD AUTO: 41.9 % (ref 37.5–51)
HGB BLD-MCNC: 13.9 G/DL (ref 13–17.7)
HOLD SPECIMEN: NORMAL
HOLD SPECIMEN: NORMAL
IMM GRANULOCYTES # BLD AUTO: 0.02 10*3/MM3 (ref 0–0.05)
IMM GRANULOCYTES NFR BLD AUTO: 0.2 % (ref 0–0.5)
LYMPHOCYTES # BLD AUTO: 1.62 10*3/MM3 (ref 0.7–3.1)
LYMPHOCYTES NFR BLD AUTO: 20 % (ref 19.6–45.3)
MAGNESIUM SERPL-MCNC: 2 MG/DL (ref 1.6–2.4)
MCH RBC QN AUTO: 29.7 PG (ref 26.6–33)
MCHC RBC AUTO-ENTMCNC: 33.2 G/DL (ref 31.5–35.7)
MCV RBC AUTO: 89.5 FL (ref 79–97)
MONOCYTES # BLD AUTO: 0.75 10*3/MM3 (ref 0.1–0.9)
MONOCYTES NFR BLD AUTO: 9.2 % (ref 5–12)
NEUTROPHILS NFR BLD AUTO: 5.5 10*3/MM3 (ref 1.7–7)
NEUTROPHILS NFR BLD AUTO: 67.9 % (ref 42.7–76)
NRBC BLD AUTO-RTO: 0 /100 WBC (ref 0–0.2)
NT-PROBNP SERPL-MCNC: 85.1 PG/ML (ref 0–900)
PLATELET # BLD AUTO: 202 10*3/MM3 (ref 140–450)
PMV BLD AUTO: 11 FL (ref 6–12)
POTASSIUM SERPL-SCNC: 3.6 MMOL/L (ref 3.5–5.2)
PROCALCITONIN SERPL-MCNC: 0.03 NG/ML (ref 0–0.25)
PROT SERPL-MCNC: 7.2 G/DL (ref 6–8.5)
RBC # BLD AUTO: 4.68 10*6/MM3 (ref 4.14–5.8)
SARS-COV-2 RNA RESP QL NAA+PROBE: DETECTED
SODIUM SERPL-SCNC: 141 MMOL/L (ref 136–145)
TROPONIN T SERPL HS-MCNC: 11 NG/L
WBC NRBC COR # BLD AUTO: 8.11 10*3/MM3 (ref 3.4–10.8)
WHOLE BLOOD HOLD COAG: NORMAL
WHOLE BLOOD HOLD SPECIMEN: NORMAL

## 2024-08-01 PROCEDURE — 86140 C-REACTIVE PROTEIN: CPT | Performed by: STUDENT IN AN ORGANIZED HEALTH CARE EDUCATION/TRAINING PROGRAM

## 2024-08-01 PROCEDURE — 83880 ASSAY OF NATRIURETIC PEPTIDE: CPT | Performed by: STUDENT IN AN ORGANIZED HEALTH CARE EDUCATION/TRAINING PROGRAM

## 2024-08-01 PROCEDURE — 83735 ASSAY OF MAGNESIUM: CPT | Performed by: STUDENT IN AN ORGANIZED HEALTH CARE EDUCATION/TRAINING PROGRAM

## 2024-08-01 PROCEDURE — 99284 EMERGENCY DEPT VISIT MOD MDM: CPT

## 2024-08-01 PROCEDURE — 93005 ELECTROCARDIOGRAM TRACING: CPT | Performed by: STUDENT IN AN ORGANIZED HEALTH CARE EDUCATION/TRAINING PROGRAM

## 2024-08-01 PROCEDURE — 80053 COMPREHEN METABOLIC PANEL: CPT | Performed by: STUDENT IN AN ORGANIZED HEALTH CARE EDUCATION/TRAINING PROGRAM

## 2024-08-01 PROCEDURE — 84145 PROCALCITONIN (PCT): CPT | Performed by: STUDENT IN AN ORGANIZED HEALTH CARE EDUCATION/TRAINING PROGRAM

## 2024-08-01 PROCEDURE — 71045 X-RAY EXAM CHEST 1 VIEW: CPT

## 2024-08-01 PROCEDURE — 85025 COMPLETE CBC W/AUTO DIFF WBC: CPT | Performed by: STUDENT IN AN ORGANIZED HEALTH CARE EDUCATION/TRAINING PROGRAM

## 2024-08-01 PROCEDURE — 84484 ASSAY OF TROPONIN QUANT: CPT | Performed by: STUDENT IN AN ORGANIZED HEALTH CARE EDUCATION/TRAINING PROGRAM

## 2024-08-01 PROCEDURE — 70450 CT HEAD/BRAIN W/O DYE: CPT

## 2024-08-01 PROCEDURE — 87636 SARSCOV2 & INF A&B AMP PRB: CPT | Performed by: STUDENT IN AN ORGANIZED HEALTH CARE EDUCATION/TRAINING PROGRAM

## 2024-08-01 RX ORDER — ONDANSETRON 4 MG/1
4 TABLET, ORALLY DISINTEGRATING ORAL EVERY 8 HOURS PRN
Qty: 20 TABLET | Refills: 0 | Status: SHIPPED | OUTPATIENT
Start: 2024-08-01

## 2024-08-01 RX ORDER — ASPIRIN 325 MG
325 TABLET ORAL ONCE
Status: COMPLETED | OUTPATIENT
Start: 2024-08-01 | End: 2024-08-01

## 2024-08-01 RX ORDER — SODIUM CHLORIDE 0.9 % (FLUSH) 0.9 %
10 SYRINGE (ML) INJECTION AS NEEDED
Status: DISCONTINUED | OUTPATIENT
Start: 2024-08-01 | End: 2024-08-01 | Stop reason: HOSPADM

## 2024-08-01 RX ADMIN — ASPIRIN 325 MG: 325 TABLET ORAL at 17:45

## 2024-08-01 NOTE — Clinical Note
[facility] EMERGENCY DEPARTMENT  [address] [address]  Phone: [phone]    Irineo Jewell was seen and treated in our emergency department on 8/1/2024.  He may return to work on 08/05/2024.         Thank you for choosing [facility].    Osmin Painting MD

## 2024-08-01 NOTE — Clinical Note
[facility] EMERGENCY DEPARTMENT  [address] [address]  Phone: [phone]    Irineo Jewell was seen and treated in our emergency department on 8/1/2024.  He may return to work on 08/05/2024.         Thank you for choosing [facility].    Osmin Painting MD

## 2024-08-02 NOTE — ED PROVIDER NOTES
Subjective:  History of Present Illness:    Patient is a 63-year-old male with history of hyperlipidemia, CAD, CVA who presents today with chest pain, headache, malaise.  Reports onset of symptoms over the last 3 days.  Has also experienced nausea and anorexia but denies any vomiting.  No diarrhea.  Denies any abdominal pain.  No unilateral leg swelling or leg pain.  No personal history of PE/DVT.      Nurses Notes reviewed and agree, including vitals, allergies, social history and prior medical history.     REVIEW OF SYSTEMS: All systems reviewed and not pertinent unless noted.  Review of Systems   Constitutional:  Positive for activity change, appetite change, chills, fatigue and fever.   HENT:  Positive for congestion, rhinorrhea and sinus pressure. Negative for sneezing and trouble swallowing.    Eyes:  Negative for discharge and itching.   Respiratory:  Positive for cough. Negative for shortness of breath.    Cardiovascular:  Negative for chest pain and palpitations.   Gastrointestinal:  Positive for nausea. Negative for abdominal distention, abdominal pain, constipation, diarrhea and vomiting.   Endocrine: Negative for cold intolerance and heat intolerance.   Genitourinary:  Negative for decreased urine volume, dysuria and urgency.   Musculoskeletal:  Negative for gait problem, neck pain and neck stiffness.   Skin:  Negative for color change and rash.   Allergic/Immunologic: Negative for immunocompromised state.   Neurological:  Negative for facial asymmetry and headaches.   Hematological:  Negative for adenopathy.   Psychiatric/Behavioral:  Negative for self-injury and suicidal ideas.        Past Medical History:   Diagnosis Date    Hyperlipidemia     Palpitations        Allergies:    No known drug allergy      Past Surgical History:   Procedure Laterality Date    CHOLECYSTECTOMY      KNEE SURGERY           Social History     Socioeconomic History    Marital status:    Tobacco Use    Smoking status:  "Former    Smokeless tobacco: Never   Vaping Use    Vaping status: Never Used   Substance and Sexual Activity    Alcohol use: No    Drug use: No    Sexual activity: Defer         History reviewed. No pertinent family history.    Objective  Physical Exam:  /78   Pulse 56   Temp 98.6 °F (37 °C)   Resp 18   Ht 170.2 cm (67\")   Wt 113 kg (250 lb)   SpO2 95%   BMI 39.16 kg/m²      Physical Exam  Constitutional:       General: He is not in acute distress.     Appearance: Normal appearance. He is normal weight. He is not ill-appearing.   HENT:      Head: Normocephalic and atraumatic.      Nose: Nose normal. No congestion or rhinorrhea.      Mouth/Throat:      Mouth: Mucous membranes are moist.      Pharynx: Oropharynx is clear.   Eyes:      Extraocular Movements: Extraocular movements intact.      Conjunctiva/sclera: Conjunctivae normal.      Pupils: Pupils are equal, round, and reactive to light.   Cardiovascular:      Rate and Rhythm: Normal rate and regular rhythm.      Pulses: Normal pulses.   Pulmonary:      Effort: Pulmonary effort is normal. No respiratory distress.      Breath sounds: Normal breath sounds.   Abdominal:      General: Abdomen is flat. Bowel sounds are normal. There is no distension.      Palpations: Abdomen is soft.      Tenderness: There is no abdominal tenderness.   Musculoskeletal:         General: No swelling or tenderness. Normal range of motion.      Cervical back: Normal range of motion and neck supple. No rigidity or tenderness.   Skin:     General: Skin is warm and dry.      Capillary Refill: Capillary refill takes less than 2 seconds.   Neurological:      General: No focal deficit present.      Mental Status: He is alert and oriented to person, place, and time. Mental status is at baseline.      Cranial Nerves: No cranial nerve deficit.      Sensory: No sensory deficit.      Motor: No weakness.   Psychiatric:         Mood and Affect: Mood normal.         Behavior: Behavior " normal.         Thought Content: Thought content normal.         Judgment: Judgment normal.         Procedures    ED Course:    ED Course as of 08/02/24 0014   Thu Aug 01, 2024   1728 EKG interpreted by me, sinus bradycardia with no concerning ST changes noted, rate of 59 [JE]      ED Course User Index  [JE] Osmin Painting MD       Lab Results (last 24 hours)       Procedure Component Value Units Date/Time    CBC & Differential [[id number]]  (Normal) Collected: 08/01/24 1726    Specimen: Blood Updated: 08/01/24 1735    Narrative:      The following orders were created for panel order CBC & Differential.  Procedure                               Abnormality         Status                     ---------                               -----------         ------                     CBC Auto Differential[[id number]]        Normal              Final result                 Please view results for these tests on the individual orders.    Comprehensive Metabolic Panel [[id number]]  (Abnormal) Collected: 08/01/24 1726    Specimen: Blood Updated: 08/01/24 1804     Glucose 135 mg/dL      BUN 10 mg/dL      Creatinine 1.15 mg/dL      Sodium 141 mmol/L      Potassium 3.6 mmol/L      Chloride 103 mmol/L      CO2 26.6 mmol/L      Calcium 8.9 mg/dL      Total Protein 7.2 g/dL      Albumin 4.4 g/dL      ALT (SGPT) 23 U/L      AST (SGOT) 21 U/L      Alkaline Phosphatase 105 U/L      Total Bilirubin 0.4 mg/dL      Globulin 2.8 gm/dL      A/G Ratio 1.6 g/dL      BUN/Creatinine Ratio 8.7     Anion Gap 11.4 mmol/L      eGFR 72.4 mL/min/1.73     Narrative:      GFR Normal >60  Chronic Kidney Disease <60  Kidney Failure <15      High Sensitivity Troponin T [[id number]]  (Normal) Collected: 08/01/24 1726    Specimen: Blood Updated: 08/01/24 1808     HS Troponin T 11 ng/L     Narrative:      High Sensitive Troponin T Reference Range:  <14.0 ng/L- Negative Female for AMI  <22.0 ng/L- Negative Male for AMI  >=14 - Abnormal Female indicating  possible myocardial injury.  >=22 - Abnormal Male indicating possible myocardial injury.   Clinicians would have to utilize clinical acumen, EKG, Troponin, and serial changes to determine if it is an Acute Myocardial Infarction or myocardial injury due to an underlying chronic condition.         CBC Auto Differential [[id number]]  (Normal) Collected: 08/01/24 1726    Specimen: Blood Updated: 08/01/24 1735     WBC 8.11 10*3/mm3      RBC 4.68 10*6/mm3      Hemoglobin 13.9 g/dL      Hematocrit 41.9 %      MCV 89.5 fL      MCH 29.7 pg      MCHC 33.2 g/dL      RDW 14.6 %      RDW-SD 47.5 fl      MPV 11.0 fL      Platelets 202 10*3/mm3      Neutrophil % 67.9 %      Lymphocyte % 20.0 %      Monocyte % 9.2 %      Eosinophil % 2.1 %      Basophil % 0.6 %      Immature Grans % 0.2 %      Neutrophils, Absolute 5.50 10*3/mm3      Lymphocytes, Absolute 1.62 10*3/mm3      Monocytes, Absolute 0.75 10*3/mm3      Eosinophils, Absolute 0.17 10*3/mm3      Basophils, Absolute 0.05 10*3/mm3      Immature Grans, Absolute 0.02 10*3/mm3      nRBC 0.0 /100 WBC     BNP [[id number]]  (Normal) Collected: 08/01/24 1726    Specimen: Blood Updated: 08/01/24 1808     proBNP 85.1 pg/mL     Narrative:      This assay is used as an aid in the diagnosis of individuals suspected of having heart failure. It can be used as an aid in the diagnosis of acute decompensated heart failure (ADHF) in patients presenting with signs and symptoms of ADHF to the emergency department (ED). In addition, NT-proBNP of <300 pg/mL indicates ADHF is not likely.    Age Range Result Interpretation  NT-proBNP Concentration (pg/mL:      <50             Positive            >450                   Gray                 300-450                    Negative             <300    50-75           Positive            >900                  Gray                300-900                  Negative            <300      >75             Positive            >1800                  Bush                 "300-1800                  Negative            <300    C-reactive Protein [[id number]]  (Normal) Collected: 08/01/24 1726    Specimen: Blood Updated: 08/01/24 1808     C-Reactive Protein <0.30 mg/dL     Procalcitonin [[id number]]  (Normal) Collected: 08/01/24 1726    Specimen: Blood Updated: 08/01/24 1811     Procalcitonin 0.03 ng/mL     Narrative:      As a Marker for Sepsis (Non-Neonates):    1. <0.5 ng/mL represents a low risk of severe sepsis and/or septic shock.  2. >2 ng/mL represents a high risk of severe sepsis and/or septic shock.    As a Marker for Lower Respiratory Tract Infections that require antibiotic therapy:    PCT on Admission    Antibiotic Therapy       6-12 Hrs later    >0.5                Strongly Recommended  >0.25 - <0.5        Recommended   0.1 - 0.25          Discouraged              Remeasure/reassess PCT  <0.1                Strongly Discouraged     Remeasure/reassess PCT    As 28 day mortality risk marker: \"Change in Procalcitonin Result\" (>80% or <=80%) if Day 0 (or Day 1) and Day 4 values are available. Refer to http://www.CPUsageThe [email or web address]-pct-calculator.[email or web address]    Change in PCT <=80%  A decrease of PCT levels below or equal to 80% defines a positive change in PCT test result representing a higher risk for 28-day all-cause mortality of patients diagnosed with severe sepsis for septic shock.    Change in PCT >80%  A decrease of PCT levels of more than 80% defines a negative change in PCT result representing a lower risk for 28-day all-cause mortality of patients diagnosed with severe sepsis or septic shock.       Magnesium [[id number]]  (Normal) Collected: 08/01/24 1726    Specimen: Blood Updated: 08/01/24 1804     Magnesium 2.0 mg/dL     COVID-19 and FLU A/B PCR, 1 HR TAT - Swab, Nasopharynx [[id number]]  (Abnormal) Collected: 08/01/24 1800    Specimen: Swab from Nasopharynx Updated: 08/01/24 1858     COVID19 Detected     Influenza A PCR Not Detected     Influenza B PCR Not Detected    Narrative:      " Fact sheet for providers: https://www.fda.gov/media/080774/download    Fact sheet for patients: https://www.fda.gov/media/758593/download    Test performed by PCR.  Influenza A and Influenza B negative results should be considered presumptive in samples that have a positive SARS-CoV-2 result.    Competitive inhibition studies showed that SARS-CoV-2 virus, when present at concentrations above 3.6E+04 copies/mL, can inhibit the detection and amplification of influenza A and influenza B virus RNA if present at or below 1.8E+02 copies/mL or 4.9E+02 copies/mL, respectively, and may lead to false negative influenza virus results. If co-infection with influenza A or influenza B virus is suspected in samples with a positive SARS-CoV-2 result, the sample should be re-tested with another FDA cleared, approved, or authorized influenza test, if influenza virus detection would change clinical management.             CT Head Without Contrast    Result Date: 8/1/2024  FINAL REPORT TECHNIQUE: Routine axial images through the head were obtained without contrast. This study was performed with techniques to keep radiation doses as low as reasonably achievable (ALARA). Individualized dose reduction techniques using automated exposure control or adjustment of mA and/or kV according to the patient's size were employed. CLINICAL HISTORY: Headache, history of CVA, eval hemorrhagic conversion FINDINGS: The ventricles are normal.  There is no mass or other abnormal hypodensity.  There is no shift of midline structures.  There is no intracranial hemorrhage.  No acute sinus or osseous abnormality is seen.     Impression: Unremarkable. Authenticated and Electronically Signed by Johnny Arias M.D. on 08/01/2024 08:56:41 PM        MDM      Initial impression of presenting illness: Malaise    DDX: includes but is not limited to: Intracranial hemorrhage, bacterial pneumonia, urinary tract infection, viral URI, COVID-19, ACS, MI    Patient arrives  stable with vitals interpreted by myself.     Pertinent features from physical exam: Oscitation, regular rhythm, no murmur, nontender to abdominal palpation.,  Normal neuroexam the time my evaluation    Initial diagnostic plan: CBC, CMP, troponin, BNP, EKG, chest x-ray, COVID swab CT head    Results from initial plan were reviewed and interpreted by me revealing no concern for acute process, patient test positive for COVID-19    Diagnostic information from other sources: Discussed wife at bedside    Interventions / Re-evaluation: Given aspirin given concerns for chest pain    Results/clinical rationale were discussed with patient at bedside    Consultations/Discussion of results with other physicians: Discussed diagnosis COVID-19 is etiology of patient's symptoms.  Have prescribed patient Zofran to help with nausea in the outpatient setting.  Given strict turn precaution for worsening chest pain, shortness of breath.    Disposition plan: Discharge  -----        Final diagnoses:   COVID-19          Osmin Painting MD  08/02/24 0014

## 2024-08-02 NOTE — DISCHARGE INSTRUCTIONS
You were evaluated for chest pain, headache, malaise, fever.  We got labs chest x-ray and a CT scan of your head.  This showed no acute findings we did find that you had COVID-19.  We believe this is the etiology of your symptoms and now stable for discharge.  We have sent you Zofran to help with nausea at home and recommend you drink plenty of fluids to prevent dehydration.  Would follow with primary care doctor to ensure that you improve appropriately.  If you begin to have chest pain or shortness of breath please come back to emergency department for further evaluation.  You are stable for discharge.

## 2024-08-29 ENCOUNTER — TELEPHONE (OUTPATIENT)
Dept: NEUROLOGY | Facility: CLINIC | Age: 64
End: 2024-08-29
Payer: COMMERCIAL

## 2024-08-29 NOTE — TELEPHONE ENCOUNTER
ATTEMPTED TO CONTACT PATIENT. NO ANSWER WHEN CALLING, LVM FOR PATIENT TO RETURN CALL.       NEED TO KNOW IF THE PATIENT HAS HAD A REDUCTION IN THE OVERALL NUMBER OF MIGRAINE DAYS OR REDUCTION IN NUMBER OF SEVERE MIGRAINE DAYS PER MONTH?

## 2024-09-04 ENCOUNTER — TELEPHONE (OUTPATIENT)
Dept: NEUROLOGY | Facility: CLINIC | Age: 64
End: 2024-09-04
Payer: COMMERCIAL

## 2024-09-12 DIAGNOSIS — G43.719 INTRACTABLE CHRONIC MIGRAINE WITHOUT AURA AND WITHOUT STATUS MIGRAINOSUS: ICD-10-CM

## 2024-09-13 RX ORDER — FREMANEZUMAB-VFRM 225 MG/1.5ML
225 INJECTION SUBCUTANEOUS
Qty: 1.68 ML | Refills: 5 | Status: SHIPPED | OUTPATIENT
Start: 2024-09-13

## 2024-09-16 NOTE — TELEPHONE ENCOUNTER
I HAVE BEEN TRYING TO CONTACT THIS PATIENT SINCE 07/16/2024. AS WE DO NOT HAVE ENOUGH INFORMATION IN THE CHART NOTES TO SUBMIT A PA. IT WILL JUST BE DENIED.     I HAVE TRIED CALLING THE PATIENT AND HAVE SENT [employer] MESSAGES AS WELL WITH NO RESPONSE.       HIS LAST VISIT WITH SOWMYA WAS ON 05/17/2024 AND THAT NOTE DOESN'T HAVE ANY DOCUMENTATION ABOUT AJOVY.  I BELIEVE THAT'S WHEN HE STARTED THE MEDICATION.       PATIENT IS SCHEDULED FOR A FOLLOW UP APPT ON 10/22/2024

## 2024-10-22 ENCOUNTER — OFFICE VISIT (OUTPATIENT)
Dept: NEUROLOGY | Facility: CLINIC | Age: 64
End: 2024-10-22
Payer: COMMERCIAL

## 2024-10-22 VITALS
BODY MASS INDEX: 39.24 KG/M2 | DIASTOLIC BLOOD PRESSURE: 80 MMHG | WEIGHT: 250 LBS | OXYGEN SATURATION: 97 % | HEIGHT: 67 IN | SYSTOLIC BLOOD PRESSURE: 104 MMHG | TEMPERATURE: 98 F | HEART RATE: 77 BPM

## 2024-10-22 DIAGNOSIS — G47.00 INSOMNIA, UNSPECIFIED TYPE: ICD-10-CM

## 2024-10-22 DIAGNOSIS — G43.009 MIGRAINE WITHOUT AURA AND WITHOUT STATUS MIGRAINOSUS, NOT INTRACTABLE: ICD-10-CM

## 2024-10-22 DIAGNOSIS — R90.89 ABNORMAL BRAIN MRI: ICD-10-CM

## 2024-10-22 DIAGNOSIS — G43.719 INTRACTABLE CHRONIC MIGRAINE WITHOUT AURA AND WITHOUT STATUS MIGRAINOSUS: ICD-10-CM

## 2024-10-22 DIAGNOSIS — G47.33 OSA (OBSTRUCTIVE SLEEP APNEA): Primary | ICD-10-CM

## 2024-10-22 RX ORDER — QUETIAPINE FUMARATE 50 MG/1
50 TABLET, FILM COATED ORAL NIGHTLY
Qty: 90 TABLET | Refills: 1 | Status: SHIPPED | OUTPATIENT
Start: 2024-10-22

## 2024-10-22 RX ORDER — ZAVEGEPANT 10 MG/.1ML
10 SPRAY NASAL AS NEEDED
Qty: 9 EACH | Refills: 3 | Status: SHIPPED | OUTPATIENT
Start: 2024-10-22

## 2024-10-22 RX ORDER — RIZATRIPTAN BENZOATE 10 MG/1
10 TABLET ORAL ONCE AS NEEDED
Qty: 9 TABLET | Refills: 3 | Status: SHIPPED | OUTPATIENT
Start: 2024-10-22

## 2024-10-22 RX ORDER — SEMAGLUTIDE 0.68 MG/ML
0.5 INJECTION, SOLUTION SUBCUTANEOUS WEEKLY
COMMUNITY
Start: 2024-09-24

## 2024-10-22 RX ORDER — COLCHICINE 0.6 MG/1
0.6 TABLET ORAL DAILY
COMMUNITY
Start: 2024-07-12

## 2024-10-22 NOTE — PROGRESS NOTES
Follow up Sleep Patient Office Visit      Patient Name: Irineo Jewell  : 1960   MRN: [id number]     Referring Physician: No ref. provider found    Chief Complaint:    Chief Complaint   Patient presents with    Follow-up     Pt reports that sleep machine is working well. He denies any issues today.        History of Present Illness: Irineo Jewell is a 63 y.o. male who is here today to follow up with KIRTI and migraines.  Currently on AutoPap 6-16cm, 95th percentile pressure 10.4cm, compliance 90%, AHI 4/hour.  He is tolerating his pressures well most of the time.  He feels he still doesn't sleep well with the AutoPap therapy- says he has issues breathing with the mask on due to his sinus issues.  He has some daytime sleepiness, regardless of whether he uses his AutoPap machine or not.  He is taking Seroquel 50mg QHS to help with sleep.  He is taking OTC Magnesium to help with sleep also.  He asks about the Inspire Device because he saw a commercial about it.  He is taking Ajovy 225mg SC monthly, Rizatriptan 10mg PRN, Zavzpret PRN, and Ibuprofen PRN.  Feels the Ajovy has helped a great deal with his migraines (ran out of that for a little over a month before he got it back).  He was referred to [facility] stroke clinic in [place] and has an appointment there in 2024- he hasn't been sooner due to not having PTO time.  He denies any episodes of transient alteration of awareness or signs of stroke.   *Home sleep study on 2024 showed evidence of KIRTI with an AHI of 13.9/hour and lowest oxygen saturation of 88%.    *DME company- [employer]  *Mask- Alternates between the nasal pillows and nasal mask   *MRI brain with and without contrast on 2024 showed-  IMPRESSION:  No acute intracranial abnormality.  Scattered cerebral white matter signal abnormalities which may represent  mild chronic ischemic/gliotic changes or changes of vasculitis.  Follow-up MRI may be helpful for  further evaluation.    Pertinent Medical History:   Current compliance report reviewed and has been scanned into the patient's medical record.    Following taken from previous visit note:  Irineo Jewell is a 63 y.o. male who is here today to follow up with migraine and KIRTI and was last seen on 3/17/2022.  Says he is using his CPAP machine about 4 days per week.  He is having difficulty tolerating his CPAP therapy due to it causing pretty severe congestion.  A home sleep study was ordered at his previous visit but that was never done, due to insurance issues.  He has had 20-25/30 headache days in the past month, with about 7 being migraine days.  He is awakening with a headache frequently, whether he uses the CPAP or not.  He was taking Seroquel for sleep but made him very drowsy the next day.  Now taking Trazodone 50mg QHS for sleep.  He is taking Rizatriptan and Iburofen PRN migraine.  Migraines described as stabbing, temporal and radiate around the head and to the top of the head, accompanied by dizziness and photophobia at times, lasting more than 4 hours.  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  The episode of transient alteration of awareness lasted for less than a minute, there was no confusion or sleepiness after the episode.  He was able to pick back up where he left off.  He did have some lightheadedness on the day of one of the episodes.  He mentions he was not sleeping well during the episode.    *Current compliance report has been requested from DME for review.   *Medications taken from migraines- Sumatriptan, Rizatriptan, Ubrelvy, Nurtec, Amitriptyline, Topiramate, Metoprolol.     Subjective      Review of Systems:   Review of Systems   Neurological:  Positive for headache.       Medications:     Current Outpatient Medications:     allopurinol (ZYLOPRIM) 100 MG tablet, Take 2 tablets by mouth Daily., Disp: , Rfl:  6    atorvastatin (LIPITOR) 40 MG tablet, , Disp: , Rfl:     Azelastine-Fluticasone 137-50 MCG/ACT suspension, into each nostril., Disp: , Rfl:     buPROPion XL (WELLBUTRIN XL) 150 MG 24 hr tablet, , Disp: , Rfl:     busPIRone (BUSPAR) 10 MG tablet, Take 1.5 tablets by mouth 3 (Three) Times a Day., Disp: , Rfl: 1    cetirizine (zyrTEC) 10 MG tablet, TAKE 1 TABLET (10 MG) BY ORAL ROUTE ONCE DAILY, Disp: , Rfl: 1    colchicine 0.6 MG tablet, Take 1 tablet by mouth Daily., Disp: , Rfl:     finasteride (PROSCAR) 5 MG tablet, , Disp: , Rfl:     Fremanezumab-vfrm (Ajovy) 225 MG/1.5ML solution auto-injector, Inject 225 mg under the skin into the appropriate area as directed Every 30 (Thirty) Days., Disp: 1.68 mL, Rfl: 5    Humira, 2 Pen, 40 MG/0.8ML Pen-injector Kit, Inject 40 mg under the skin into the appropriate area as directed., Disp: , Rfl:     ipratropium-albuterol (DUO-NEB) 0.5-2.5 mg/3 ml nebulizer, Take 3 mL by nebulization Every 4 (Four) Hours As Needed for Wheezing or Shortness of Air., Disp: 180 mL, Rfl: 0    leflunomide (ARAVA) 20 MG tablet, Take 1 tablet by mouth Daily., Disp: , Rfl:     lisinopril (PRINIVIL,ZESTRIL) 20 MG tablet, , Disp: , Rfl:     metoprolol tartrate (LOPRESSOR) 50 MG tablet, Take 1 tablet by mouth 2 (Two) Times a Day., Disp: , Rfl:     omeprazole (priLOSEC) 40 MG capsule, , Disp: , Rfl:     ondansetron ODT (ZOFRAN-ODT) 4 MG disintegrating tablet, Place 1 tablet on the tongue Every 8 (Eight) Hours As Needed for Nausea or Vomiting., Disp: 20 tablet, Rfl: 0    Ozempic, 0.25 or 0.5 MG/DOSE, 2 MG/3ML solution pen-injector, Inject 0.5 mg under the skin into the appropriate area as directed 1 (One) Time Per Week., Disp: , Rfl:     predniSONE (DELTASONE) 20 MG tablet, Take 2 pills with food in the morning for 3 days then 1 pill for 3 days, 1/2 pill for 2 days., Disp: 10 tablet, Rfl: 0    QUEtiapine (SEROquel) 50 MG tablet, Take 1 tablet by mouth Every Night., Disp: 90 tablet, Rfl: 1     "rizatriptan (Maxalt) 10 MG tablet, Take 1 tablet by mouth 1 (One) Time As Needed for Migraine. May repeat in 2 hours if needed, Disp: 9 tablet, Rfl: 3    sertraline (ZOLOFT) 50 MG tablet, Take 1 tablet by mouth Daily., Disp: , Rfl: 5    Testosterone Cypionate (DEPOTESTOTERONE CYPIONATE) 200 MG/ML injection, Inject 1 mL into the appropriate muscle as directed by prescriber., Disp: , Rfl:     traZODone (DESYREL) 50 MG tablet, Take 1 tablet by mouth Every Night. Indications: Trouble Sleeping, Disp: , Rfl:     Zavegepant HCl (Zavzpret) 10 MG/ACT solution, Administer 10 mg into the nostril(s) as directed by provider As Needed (migraine)., Disp: 9 each, Rfl: 3    Allergies:   Allergies   Allergen Reactions    No Known Drug Allergy        Objective     Physical Exam:  Vital Signs:   Vitals:    10/22/24 1108   BP: 104/80   BP Location: Right arm   Patient Position: Sitting   Cuff Size: Adult   Pulse: 77   Temp: 98 °F (36.7 °C)   TempSrc: Infrared   SpO2: 97%   Weight: 113 kg (250 lb)   Height: 170.2 cm (67.01\")   PainSc: 0-No pain     BMI: Body mass index is 39.15 kg/m².    Physical Exam  Vitals and nursing note reviewed.   Constitutional:       General: He is not in acute distress.     Appearance: Normal appearance. He is well-developed. He is obese. He is not diaphoretic.   HENT:      Head: Normocephalic and atraumatic.   Eyes:      Extraocular Movements: Extraocular movements intact.      Conjunctiva/sclera: Conjunctivae normal.   Pulmonary:      Effort: Pulmonary effort is normal. No respiratory distress.   Musculoskeletal:         General: Normal range of motion.   Skin:     General: Skin is warm and dry.   Neurological:      Mental Status: He is alert and oriented to person, place, and time.   Psychiatric:         Mood and Affect: Mood normal.         Behavior: Behavior normal.         Thought Content: Thought content normal.         Judgment: Judgment normal.         Assessment / Plan      Assessment/Plan: "   Diagnoses and all orders for this visit:    1. KIRTI (obstructive sleep apnea) (Primary)    2. Intractable chronic migraine without aura and without status migrainosus  -     rizatriptan (Maxalt) 10 MG tablet; Take 1 tablet by mouth 1 (One) Time As Needed for Migraine. May repeat in 2 hours if needed  Dispense: 9 tablet; Refill: 3    3. Abnormal brain MRI    4. Migraine without aura and without status migrainosus, not intractable  -     Zavegepant HCl (Zavzpret) 10 MG/ACT solution; Administer 10 mg into the nostril(s) as directed by provider As Needed (migraine).  Dispense: 9 each; Refill: 3    5. Insomnia, unspecified type  -     QUEtiapine (SEROquel) 50 MG tablet; Take 1 tablet by mouth Every Night.  Dispense: 90 tablet; Refill: 1      *I have provided patient education on the Inspire Device today.  He is going to read about this therapy and see if it is something he would be interested in, and if so I will enter a referral to ENT for further evaluation. Encouraged him to continue to use his AutoPap for now and he is agreeable.   *Will continue Ajovy, Rizatriptan and Zavzpret for now.   *Stroke prevention education provided today.  I have discussed the importance of him keeping his appointment with the stroke clinic and he verbalizes understanding.   *Call 911 for any signs/symptoms of stroke.   *LDL goal <70. He is taking Atorvastatin 40mg daily.   *HgbA1c goal <7%.       Follow Up:   Return in about 6 months (around 4/22/2025) for Follow Up.    *Order for PAP supplies sent to patient's [employer] company.     I have advised the patient the need to continue the use of CPAP.  Gold standard for treatment of sleep apnea includes weight loss, use of cpap and avoidance of alcohol.  Encouraged weight loss (if applicable) with a BMI goal of 24.  Untreated KIRTI may increase the risk for development of hypertension, stroke, myocardial infarction, diabetes, cardiovascular disease, work-related issues and driving accidents. I have  counseled and advised the patient to avoid driving or operating heavy/dangerous equipment if feeling drowsy.     LATOYA Lan, FNP-C  [facility]

## 2024-11-26 ENCOUNTER — OFFICE VISIT (OUTPATIENT)
Dept: NEUROLOGY | Facility: CLINIC | Age: 64
End: 2024-11-26
Payer: COMMERCIAL

## 2024-11-26 VITALS
WEIGHT: 248 LBS | BODY MASS INDEX: 38.92 KG/M2 | SYSTOLIC BLOOD PRESSURE: 116 MMHG | HEIGHT: 67 IN | DIASTOLIC BLOOD PRESSURE: 76 MMHG | HEART RATE: 76 BPM | OXYGEN SATURATION: 95 % | TEMPERATURE: 97.9 F

## 2024-11-26 DIAGNOSIS — R46.89 COGNITIVE AND BEHAVIORAL CHANGES: ICD-10-CM

## 2024-11-26 DIAGNOSIS — I67.9 SMALL VESSEL DISEASE, CEREBROVASCULAR: Primary | ICD-10-CM

## 2024-11-26 DIAGNOSIS — R68.89 NASAL AIRWAY ABNORMALITY: ICD-10-CM

## 2024-11-26 DIAGNOSIS — G43.711 INTRACTABLE CHRONIC MIGRAINE WITHOUT AURA AND WITH STATUS MIGRAINOSUS: ICD-10-CM

## 2024-11-26 DIAGNOSIS — G47.33 OSA (OBSTRUCTIVE SLEEP APNEA): ICD-10-CM

## 2024-11-26 DIAGNOSIS — R41.89 COGNITIVE AND BEHAVIORAL CHANGES: ICD-10-CM

## 2024-11-26 RX ORDER — HYDROXYZINE PAMOATE 50 MG/1
CAPSULE ORAL
COMMUNITY
Start: 2024-11-25

## 2024-11-26 RX ORDER — IBUPROFEN 600 MG/1
600 TABLET, FILM COATED ORAL 3 TIMES DAILY
Status: SHIPPED | OUTPATIENT
Start: 2024-11-26 | End: 2024-11-30

## 2024-11-26 RX ORDER — DICYCLOMINE HCL 20 MG
TABLET ORAL
COMMUNITY
Start: 2024-11-25

## 2024-11-26 RX ORDER — PSEUDOEPHEDRINE HCL 120 MG/1
TABLET, FILM COATED, EXTENDED RELEASE ORAL
COMMUNITY
Start: 2024-11-06

## 2024-11-26 RX ORDER — BISACODYL 5 MG
TABLET, DELAYED RELEASE (ENTERIC COATED) ORAL
COMMUNITY
Start: 2024-11-25

## 2024-11-27 ENCOUNTER — TELEPHONE (OUTPATIENT)
Dept: NEUROLOGY | Facility: CLINIC | Age: 64
End: 2024-11-27
Payer: COMMERCIAL

## 2024-11-27 ENCOUNTER — TELEPHONE (OUTPATIENT)
Dept: NEUROLOGY | Facility: CLINIC | Age: 64
End: 2024-11-27

## 2024-11-27 NOTE — TELEPHONE ENCOUNTER
Pharmacy Name:    [place], [place] - [address] - [phone]  - [phone]   [address] [address]  Phone: [phone] Fax: [phone]     Pharmacy representative name: MARIE    Pharmacy representative phone number: [phone] [facility] [address]  FAX: [phone]    What medication are you calling in regards to: buprofen (ADVIL,MOTRIN) tablet 600 mg     What question does the pharmacy have: PHARMACY WAS ADVISED BY PT THIS SHOULD HAVE BEEN CALLED IN BUT SHE STATES IT WAS NOT RECEIVED.

## 2024-12-01 PROBLEM — R41.89 COGNITIVE AND BEHAVIORAL CHANGES: Status: ACTIVE | Noted: 2024-12-01

## 2024-12-01 PROBLEM — I67.9 SMALL VESSEL DISEASE, CEREBROVASCULAR: Status: ACTIVE | Noted: 2024-12-01

## 2024-12-01 PROBLEM — G47.33 OSA (OBSTRUCTIVE SLEEP APNEA): Status: ACTIVE | Noted: 2024-12-01

## 2024-12-01 PROBLEM — R46.89 COGNITIVE AND BEHAVIORAL CHANGES: Status: ACTIVE | Noted: 2024-12-01

## 2024-12-01 PROBLEM — G43.711 INTRACTABLE CHRONIC MIGRAINE WITHOUT AURA AND WITH STATUS MIGRAINOSUS: Status: ACTIVE | Noted: 2024-12-01

## 2024-12-01 PROBLEM — R68.89 NASAL AIRWAY ABNORMALITY: Status: ACTIVE | Noted: 2024-12-01

## 2024-12-01 NOTE — PROGRESS NOTES
Stroke Clinic Office Visit     Encounter Date: 2024   Patient Name: Irineo Jewell  : 1960  MRN: [id number]   PCP: Lindsey Sanchez APRN  Referring Provider: Francine Reveles*     Chief Complaint Establish Care    History of Present Illness  Irineo Jewell is a 63 y.o. right handed male here to establish care for abnormal MRI Brain 2024 and chronic headaches.    The patient has a past medical history of KIRTI (noncompliant), HTN, HLD, Obesity, Anxiety/Depression, BPH.   The patient states, he is experiencing at least 27 headache days a week, which have slowly increased in frequency over the past few months.    He describes his headaches as progressively increasing in pain severity during the day.    He will occasionally have visual obscurations and/or memory difficulties prior to the onset of his headahces.    The characteristics of his headaches have not changed in quite sometime, he continues to struggle with unilateral pain, which sometimes becomes a holocranial headache that is most often intractable to Tylenol.    He has tried multiple medications in the past including: Ajovy, BetaBlockers (he is currently taking Metoprolol, Antidepressents (cymbalta), and antiseizure medicaiton (Topamax).  He is taking Rizatriptan with modest benefit.  The patient believes his headaches are often triggered by stress.  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  Of note, the patient was advised to make an appt. with neurology in May,  He states, often had to cancel these appointments due to his wife's illness and care.    We reviewed his MRI of the brain 2024, which reveals small vessel disease.  Also, we discussed the importance of primary stroke reduction and the patient verbalizes understanding.    Patient denies any other neurological symptoms, including: headache, vision changes, dysesthesias, loss of consciousness, seizure or new areas of motor  Subjective   Patient ID: Sarah is a 12 month old female who is accompanied by:mother and brother    Well Child 12 Month    HPI  Additional concerns today: None     Review of Systems   All other systems reviewed and are negative.      Patient's medications, allergies, past medical, surgical, social and family histories were reviewed and updated as appropriate.    Objective   Vitals: Ht 29\" (73.7 cm)   Wt 8.788 kg (19 lb 6 oz)   HC 45.1 cm (17.75\")   BMI 16.20 kg/m²   BSA 0.41 m²   Growth parameters are noted and are appropriate for age.  Physical Exam  Constitutional:       General: She is active.      Appearance: She is well-developed.   HENT:      Right Ear: Tympanic membrane normal.      Left Ear: Tympanic membrane normal.      Nose: Nose normal.      Mouth/Throat:      Mouth: Mucous membranes are moist.      Pharynx: Oropharynx is clear.   Eyes:      General: Red reflex is present bilaterally.      Conjunctiva/sclera: Conjunctivae normal.   Cardiovascular:      Rate and Rhythm: Regular rhythm.      Heart sounds: No murmur.   Pulmonary:      Breath sounds: Normal breath sounds.   Abdominal:      General: There is no distension.      Palpations: Abdomen is soft. There is no mass.      Tenderness: There is no abdominal tenderness.   Genitourinary:     Comments:  - normal for age  Musculoskeletal:         General: Normal range of motion.      Cervical back: Neck supple.   Skin:     General: Skin is warm.      Findings: No rash.   Neurological:      Mental Status: She is alert.      Cranial Nerves: No cranial nerve deficit.      Motor: No abnormal muscle tone.         Assessment   Problem List Items Addressed This Visit     None      Visit Diagnoses     Encounter for routine child health examination without abnormal findings    -  Primary    Relevant Orders    HEPATITIS A VACCINE PEDIATRIC / ADOLESCENT 2 DOSE IM (Completed)    MMR VACC, SQ (Completed)    VARICELLA CHICKEN POX LIVES VACC, SQ (VARIVAX) (Completed)     DCI HEMOGLOBIN - WAIVED (Completed)          Screening tests  Developmental milestones were reviewed and were: normal based on age    Immunizations today: per orders.  History of previous adverse reactions to immunizations? no  Immunizations given today and vaccine counseling including benefits, risks, and adverse reactions were provided by myself during the visit.    Follow-up for the 15 month well child visit, or sooner as needed.   weakness.        The patient is most bothered with persistent headaches, which started dating back to his youth. He has been prescribed Ajovy for his headaches, which provides some relief, and he also takes ibuprofen 800 mg as needed. Despite these treatments, he continues to experience headaches, which he believes are exacerbated by his use of Ozempic. He also experiences dizziness and numbness in the middle of his head.  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 His primary care physician referred him to a neurologist due to concerns about his health. After a scan at [facility] in [place], he was informed that he had suffered a stroke and was advised to use a CPAP machine consistently. However, he struggles with the CPAP due to difficulty breathing and often removes it during the night.  He also reports feeling anxious and having difficulty sleeping, which he attributes to his headaches and the side effects of his medications. He is currently taking CoQ10, tizanidine, Zoloft, Seroquel, and hydroxyzine. Additionally, he feels stressed due to work pressures and personal issues, including his wife's health problems. He is considering changing jobs or semi-retiring to reduce his stress levels.  Patient denies any other neurological symptoms, including: headache, vision changes, dysesthesias, loss of consciousness, seizure or new areas of motor weakness.              Subjective     Past Medical History:   Diagnosis Date    Hyperlipidemia     Palpitations       Past Surgical History:   Procedure Laterality Date    CHOLECYSTECTOMY      KNEE SURGERY       History reviewed. No pertinent family history.   Social History     Socioeconomic History    Marital status:    Tobacco Use    Smoking status: Former     Passive exposure: Past    Smokeless tobacco: Never   Vaping Use    Vaping status: Never Used   Substance and Sexual Activity    Alcohol use: No    Drug  use: No    Sexual activity: Defer       Current Outpatient Medications:     allopurinol (ZYLOPRIM) 100 MG tablet, Take 2 tablets by mouth Daily., Disp: , Rfl: 6    atorvastatin (LIPITOR) 40 MG tablet, , Disp: , Rfl:     Azelastine-Fluticasone 137-50 MCG/ACT suspension, into each nostril., Disp: , Rfl:     bisacodyl 5 MG EC tablet, , Disp: , Rfl:     cetirizine (zyrTEC) 10 MG tablet, TAKE 1 TABLET (10 MG) BY ORAL ROUTE ONCE DAILY, Disp: , Rfl: 1    colchicine 0.6 MG tablet, Take 1 tablet by mouth Daily., Disp: , Rfl:     dicyclomine (BENTYL) 20 MG tablet, , Disp: , Rfl:     finasteride (PROSCAR) 5 MG tablet, , Disp: , Rfl:     Fremanezumab-vfrm (Ajovy) 225 MG/1.5ML solution auto-injector, Inject 225 mg under the skin into the appropriate area as directed Every 30 (Thirty) Days., Disp: 1.68 mL, Rfl: 5    Humira, 2 Pen, 40 MG/0.8ML Pen-injector Kit, Inject 40 mg under the skin into the appropriate area as directed., Disp: , Rfl:     hydrOXYzine pamoate (VISTARIL) 50 MG capsule, , Disp: , Rfl:     ipratropium-albuterol (DUO-NEB) 0.5-2.5 mg/3 ml nebulizer, Take 3 mL by nebulization Every 4 (Four) Hours As Needed for Wheezing or Shortness of Air., Disp: 180 mL, Rfl: 0    leflunomide (ARAVA) 20 MG tablet, Take 1 tablet by mouth Daily., Disp: , Rfl:     lisinopril (PRINIVIL,ZESTRIL) 20 MG tablet, , Disp: , Rfl:     metoprolol tartrate (LOPRESSOR) 50 MG tablet, Take 1 tablet by mouth 2 (Two) Times a Day., Disp: , Rfl:     omeprazole (priLOSEC) 40 MG capsule, , Disp: , Rfl:     ondansetron ODT (ZOFRAN-ODT) 4 MG disintegrating tablet, Place 1 tablet on the tongue Every 8 (Eight) Hours As Needed for Nausea or Vomiting., Disp: 20 tablet, Rfl: 0    predniSONE (DELTASONE) 20 MG tablet, Take 2 pills with food in the morning for 3 days then 1 pill for 3 days, 1/2 pill for 2 days., Disp: 10 tablet, Rfl: 0    pseudoephedrine (SUDAFED) 120 MG 12 hr tablet, TAKE ONE (1) TABLET BY MOUTH EVERY 12 HOURS, Disp: , Rfl:     QUEtiapine  "(SEROquel) 50 MG tablet, Take 1 tablet by mouth Every Night., Disp: 90 tablet, Rfl: 1    rizatriptan (Maxalt) 10 MG tablet, Take 1 tablet by mouth 1 (One) Time As Needed for Migraine. May repeat in 2 hours if needed, Disp: 9 tablet, Rfl: 3    sertraline (ZOLOFT) 50 MG tablet, Take 1 tablet by mouth Daily., Disp: , Rfl: 5    Testosterone Cypionate (DEPOTESTOTERONE CYPIONATE) 200 MG/ML injection, Inject 1 mL into the appropriate muscle as directed by prescriber., Disp: , Rfl:     tiZANidine (ZANAFLEX) 4 MG tablet, Take 1 tablet by mouth At Night As Needed for Muscle Spasms., Disp: , Rfl:     Zavegepant HCl (Zavzpret) 10 MG/ACT solution, Administer 10 mg into the nostril(s) as directed by provider As Needed (migraine)., Disp: 9 each, Rfl: 3    Atogepant 60 MG tablet, Take 1 tablet by mouth Daily for 30 doses., Disp: 30 tablet, Rfl: 0    Atogepant 60 MG tablet, Take 1 tablet by mouth Daily., Disp: 8 tablet, Rfl: 0    buPROPion XL (WELLBUTRIN XL) 150 MG 24 hr tablet, , Disp: , Rfl:     busPIRone (BUSPAR) 10 MG tablet, Take 1.5 tablets by mouth 3 (Three) Times a Day. (Patient not taking: Reported on 11/26/2024), Disp: , Rfl: 1    Ozempic, 0.25 or 0.5 MG/DOSE, 2 MG/3ML solution pen-injector, Inject 0.5 mg under the skin into the appropriate area as directed 1 (One) Time Per Week., Disp: , Rfl:     traZODone (DESYREL) 50 MG tablet, Take 1 tablet by mouth Every Night. Indications: Trouble Sleeping (Patient not taking: Reported on 11/26/2024), Disp: , Rfl:   No current facility-administered medications for this visit.   Allergies   Allergen Reactions    No Known Drug Allergy         Objective     Physical Exam:  Vitals:    11/26/24 1301   BP: 116/76   Pulse: 76   Temp: 97.9 °F (36.6 °C)   SpO2: 95%   Weight: 112 kg (248 lb)   Height: 170.2 cm (67.01\")      Body mass index is 38.83 kg/m².     Physical Exam:  General Appearance: Alert  Eyes: Anicteric sclera  HEENT: no scleral injection   Lungs: respirations appear comfortable, " no obvious increased work of breathing  Extremities: No cyanosis or fingernail clubbing   Skin: No rashes in exposed skin areas     Neurological Examination:   Mental status: Alert and oriented to person, place, and time. Speech with no dysarthria, able to name and repeat with no difficulty.    Cranial Nerves: Visual fields intact. Extraocular movements are intact with no nystagmus. Facial sensation intact. Face symmetrical. Hearing grossly intact. Palate movement is symmetric. Full shoulder shrug bilaterally. Tongue protrudes midline.   Sensory: Sensory exam to light touch in all four extremities distally is normal. Double simultaneous sensory stimulation shows no extinction  Motor: Normal tone throughout. Normal bulk. Pronator drift is absent.  Left upper extremity: 5/5 deltoid, tricep, bicep, interosseous, hand .   Right upper extremity: 5/5 deltoid, tricep, bicep, interosseous, hand .   Left lower extremity: 5/5 iliopsoas, knee extension/flexion, foot dorsi/plantarflexion.  Right lower extremity: 5/5 iliopsoas, knee extension/flexion, foot dorsi/plantarflexion.  Cerebellar: Finger-to-nose intact. Heel-to-shin intact. Rapid alternating movements are intact.   Gait: Normal.    NIHSS:     1a  Level of consciousness: 0=alert; keenly responsive   1b. LOC questions:  0=Performs both tasks correctly   1c. LOC commands: 0=Answers both questions correctly   2.  Best Gaze: 0=normal   3.  Visual: 0=No visual loss   4. Facial Palsy: 0=Normal symmetric movement   5a.  Motor left arm: 0=No drift, limb holds 90 (or 45) degrees for full 10 seconds   5b.  Motor right arm: 0=No drift, limb holds 90 (or 45) degrees for full 10 seconds   6a. motor left le=No drift, limb holds 90 (or 45) degrees for full 10 seconds   6b  Motor right le=No drift, limb holds 90 (or 45) degrees for full 10 seconds   7. Limb Ataxia: 0=Absent   8.  Sensory: 0=Normal; no sensory loss   9. Best Language:  0=No aphasia, normal   10.  Dysarthria: 0=Normal   11. Extinction and Inattention: 0=No abnormality     Modified Dillon Score based on in-office assessment of alertness, orientation, and physical mobility. Further assessment with neurocognitive testing may be needed to elucidate full extent of cognitive abilities: 0 = No Symptoms    Over the past month…    Snoring:   Do you snore loudly (loud enough to be heard through closed doors or your bed partner elbows you for snoring at night)?    yes   Tired:   Do you often feel tired, fatigued or sleepy during the daytime (such as falling asleep during driving or talking to someone)?  yes   Observed:   Has anyone observed you stop breathing or choking/gasping during your sleep?  yes   Pressure:   Do you have or are you being treated for high blood pressure?  yes   Body Mass Index:   Is the BMI > 35kg/m2 ?   BMI = Body mass index is 38.83 kg/m².  yes   Age:   Older than 50?  yes   Neck Size:   Is your shirt collar > 16 inches/40 cm or larger? (measured around Mobley apple).   Neck Size =  yes     Gender:   Male/Female?  Male   Total Score:   2+     0-2 = KIRTI Low Risk    3-4 = KIRTI Intermediate Risk    5-8 = KIRTI High Risk  Or >2 + male gender  Or >2 + BMI > 35kg/m2  Or > 2 + neck circumference 16 inches / 40cm       PHQ-9 Depression Screening  Little interest or pleasure in doing things? Not at all   Feeling down, depressed, or hopeless? Several days   PHQ-2 Total Score 1   Trouble falling or staying asleep, or sleeping too much?     Feeling tired or having little energy?     Poor appetite or overeating?     Feeling bad about yourself - or that you are a failure or have let yourself or your family down?     Trouble concentrating on things, such as reading the newspaper or watching television?     Moving or speaking so slowly that other people could have noticed? Or the opposite - being so fidgety or restless that you have been moving around a lot more than usual?     Thoughts that you would be better off  dead, or of hurting yourself in some way?     PHQ-9 Total Score     If you checked off any problems, how difficult have these problems made it for you to do your work, take care of things at home, or get along with other people?          MARVIN Fall Risk Clinician Key Questions   Have you fallen in the past year?: No  Do you feel unsteady with walking?: No  Are you worried about falling?: No  Stay Idependant Patient Questions   Patient Fall Risk Assessment Score : 0  Fall Risk Category  Fall Risk Category: Low    Imaging Reviewed:   PROCEDURE: MRI BRAIN W WO CONTRAST-   HISTORY: transient alteration of awareness; G43.719-Chronic migraine  without aura, intractable, without status migrainosus; R40.4-Transient  alteration of awareness    IMPRESSION:  No acute intracranial abnormality.     Scattered cerebral white matter signal abnormalities which may represent  mild chronic ischemic/gliotic changes or changes of vasculitis.  Follow-up MRI may be helpful for further evaluation.      CT Head Without Contrast [[id number]] (Order [id number])  Order 8/01/2024    Narrative & Impression   FINAL REPORT     CLINICAL HISTORY:  Headache, history of CVA, eval hemorrhagic conversion     FINDINGS:  The ventricles are normal.  There is no mass or other abnormal  hypodensity.  There is no shift of midline structures.  There is  no intracranial hemorrhage.  No acute sinus or osseous  abnormality is seen.     IMPRESSION:  Unremarkable.       Laboratory Results:   Hemoglobin   Date Value Ref Range Status   08/01/2024 13.9 13.0 - 17.7 g/dL Final   07/10/2024 13.2 (L) 13.7 - 17.5 g/dL Final     Hematocrit   Date Value Ref Range Status   08/01/2024 41.9 37.5 - 51.0 % Final   07/10/2024 41.3 40.0 - 51.0 % Final     Platelets   Date Value Ref Range Status   08/01/2024 202 140 - 450 10*3/mm3 Final   07/10/2024 258 155 - 369 10*3/uL Final     AST (SGOT)   Date Value Ref Range Status   08/01/2024 21 1 - 40 U/L Final   07/10/2024 18 10 - 50 U/L Final      ALT (SGPT)   Date Value Ref Range Status   08/01/2024 23 1 - 41 U/L Final   07/10/2024 29 10 - 50 U/L Final           Assessment / Plan      Assessment and Plan    Assessment & Plan  1. Headaches.  His headaches are likely secondary to dehydration, sleep deprivation, stress, and inconsistent sleep patterns. His underlying history of migraines is being exacerbated by obstructive sleep apnea, which is preventing him from achieving restful sleep. 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  ENT has advised a surgical intervention)may also be also contributing to his sleep disturbances. His headaches, which are not typical of migraines, are likely due to environmental stressors, food, and drink. The Ajovy he is currently taking does not seem to be providing significant relief. He was advised to increase his water intake to 32 ounces per day and to maintain a consistent bedtime. A prescription for magnesium 400 mg to be taken at bedtime was provided to aid sleep. He was also advised to take ibuprofen 400 mg three times a day for three days to help manage his headaches. A prescription for Qulipta 60 mg, one tablet daily for 30 days with one refill, was provided. He was advised to discontinue Ajovy. If his headaches do not improve with the current treatment plan, a daily medication will be added, and Maxalt will be used for breakthrough headaches.    2. Obstructive Sleep Apnea.  He is experiencing difficulty using his CPAP due to an intranasal anomaly, which may be contributing to his sleep disturbances and exacerbating his headaches. He was advised to consult with an ENT specialist regarding his nasal anomaly and to consider the surgical procedure to correct it. A referral to a sleep medicine specialist was made to discuss potential procedures for his sleep apnea. He was also advised to use a humidifier with his CPAP to help with  breathing.    3. Anxiety.  He is experiencing significant anxiety, which is contributing to his sleep disturbances and headaches. He was prescribed hydroxyzine for anxiety and reported that it helped him sleep. He was advised to continue using hydroxyzine as needed for anxiety. A referral to behavioral health was made to help manage his anxiety and stress.    4. Medication Management.  He is currently taking Ozempic, which may be contributing to his symptoms of dizziness and headaches. He was advised to monitor his symptoms and discuss any concerns with his PCP. He was also advised to continue using rizatriptan 10 mg for headaches as needed.    Diagnoses and all orders for this visit:    1. Small vessel disease, cerebrovascular (Primary)    2. Cognitive and behavioral changes  -     Ambulatory Referral to Sleep Medicine  -     Ambulatory Referral to Behavioral Health    3. Intractable chronic migraine without aura and with status migrainosus  Comments:  Advised taking 600 mg Ibuprofen three times a day for three days to break the cycle of headahce.  Orders:  -     ibuprofen (ADVIL,MOTRIN) tablet 600 mg    4. KIRTI (obstructive sleep apnea)  -     Ambulatory Referral to Sleep Medicine    5. Nasal airway abnormality  -     Ambulatory Referral to Sleep Medicine  -     Ambulatory Referral to Behavioral Health    Other orders  -     Atogepant 60 MG tablet; Take 1 tablet by mouth Daily for 30 doses.  Dispense: 30 tablet; Refill: 0  -     Atogepant 60 MG tablet; Take 1 tablet by mouth Daily.  Dispense: 8 tablet; Refill: 0    Primary Stroke Prevention Measures:   -Start 81 mg of aspirin   -Continue 40 mg of Atorvastatin  -Chair Yoga exercise 30 minutes 3 - 4 times a day  -Heart and Brain Healthy diet, Consider a Mediterranean Diet.   -Continue tight control of blood sugars  -Journal BP at home and call PCP with persistent BP >130/80  -The patient was advised to follow up with his primary care physician for ongoing management  "and screening for hypertension, hypercholesterolemia, and diabetes.   -The patient was counseled on long-term blood pressure goal less than 120/80, long-term LDL goal less than 70, and long-term hemoglobin A1c goal less than 7.0% for stroke prevention. This was also printed for the patient in the after visit summary.  -The patient was counseled he experiences symptoms of acute onset unilateral arm, leg, or face weakness/numbness/tingling, unilateral facial droop, slurred speech/word finding difficulty, visual disturbance (\"curtain falling\" or visual field loss), or severe headache he should call 911 and present to the nearest emergency department immediately.    I spent 30 minutes caring for Irineo on this date of service. This time includes time spent by me in the following activities:reviewing tests, performing a medically appropriate examination and/or evaluation , counseling and educating the patient/family/caregiver, ordering medications, tests, or procedures, and documenting information in the medical record    Follow Up  No follow-ups on file.    Patient or patient representative verbalized consent for the use of Ambient Listening during the visit with  LATOYA England for chart documentation. 12/1/2024  07:00 EST    12/1/2024  06:19 EST        LATOYA England  [facility] NEURO STROKE     Part of this note may be an electronic transcription/translation of spoken language to printed text using the Dragon Dictation System.  "

## 2025-03-14 ENCOUNTER — TRANSCRIBE ORDERS (OUTPATIENT)
Dept: ADMINISTRATIVE | Facility: HOSPITAL | Age: 65
End: 2025-03-14
Payer: COMMERCIAL

## 2025-03-14 DIAGNOSIS — Z71.3 DIETARY COUNSELING AND SURVEILLANCE: ICD-10-CM

## 2025-03-14 DIAGNOSIS — E66.01 SEVERE OBESITY: Primary | ICD-10-CM

## 2025-04-01 ENCOUNTER — OFFICE VISIT (OUTPATIENT)
Dept: NEUROLOGY | Facility: CLINIC | Age: 65
End: 2025-04-01
Payer: COMMERCIAL

## 2025-04-01 VITALS
HEART RATE: 78 BPM | WEIGHT: 257.4 LBS | DIASTOLIC BLOOD PRESSURE: 84 MMHG | TEMPERATURE: 98.1 F | BODY MASS INDEX: 40.4 KG/M2 | SYSTOLIC BLOOD PRESSURE: 128 MMHG | OXYGEN SATURATION: 94 % | HEIGHT: 67 IN | RESPIRATION RATE: 14 BRPM

## 2025-04-01 DIAGNOSIS — G43.719 INTRACTABLE CHRONIC MIGRAINE WITHOUT AURA AND WITHOUT STATUS MIGRAINOSUS: ICD-10-CM

## 2025-04-01 DIAGNOSIS — G43.009 MIGRAINE WITHOUT AURA AND WITHOUT STATUS MIGRAINOSUS, NOT INTRACTABLE: ICD-10-CM

## 2025-04-01 DIAGNOSIS — G47.00 INSOMNIA, UNSPECIFIED TYPE: ICD-10-CM

## 2025-04-01 DIAGNOSIS — G47.33 OSA (OBSTRUCTIVE SLEEP APNEA): Primary | ICD-10-CM

## 2025-04-01 DIAGNOSIS — J34.2 NASAL SEPTAL DEVIATION: ICD-10-CM

## 2025-04-01 RX ORDER — QUETIAPINE FUMARATE 50 MG/1
50 TABLET, FILM COATED ORAL NIGHTLY
Qty: 90 TABLET | Refills: 3 | Status: SHIPPED | OUTPATIENT
Start: 2025-04-01

## 2025-04-01 RX ORDER — RIZATRIPTAN BENZOATE 10 MG/1
10 TABLET ORAL ONCE AS NEEDED
Qty: 9 TABLET | Refills: 11 | Status: SHIPPED | OUTPATIENT
Start: 2025-04-01

## 2025-04-01 RX ORDER — ZAVEGEPANT 10 MG/.1ML
10 SPRAY NASAL AS NEEDED
Qty: 2 EACH | Refills: 11 | Status: SHIPPED | OUTPATIENT
Start: 2025-04-01

## 2025-04-01 RX ORDER — ZAVEGEPANT 10 MG/.1ML
10 SPRAY NASAL CONTINUOUS PRN
Qty: 2 EACH | Refills: 0 | COMMUNITY
Start: 2025-04-01

## 2025-04-01 NOTE — PROGRESS NOTES
Follow up Sleep Patient Office Visit      Patient Name: Irineo Jewell  : 1960   MRN: [id number]     Referring Physician: Lindsey Sanchez APRN    Chief Complaint:    Chief Complaint   Patient presents with   • Follow-up     KIRTI; pt states he is often congested and this makes it difficult to use the CPAP.       History of Present Illness: Irineo Jewell is a 64 y.o. male who is here today to follow up with KIRTI and was last seen on 10/22/2024.  Currently on AutoPap 6-16cm, compliance >4 hours 10%, overall compliance 53%, AHI 4/hour.  His compliance is decreased because he is having difficulty tolerating the mask, due to sinus issues (has a septal deviation).  He is taking Seroquel 50mg QHS and it does help with sleep.  He would like to see [facility] to discuss his septal deviation and is interested in the Inspire Device as a treatment method for his KIRTI.  He says his migraines are doing okay right now, could be better controlled.  He is taking Atogepant 60mg Zavzpret 10mg PRN and Rizatiptan 10mg PRN and these do help with migraines.  He took Atogepant 60mg daily (samples) and they did help a great deal with his migraine frequency and severity.  He is no longer taking Ajovy.  Says he did see the provider at the stroke clinic and she asked why he was there- he was confused as he was referred there for a possible stroke.   *DME company- [employer]  *Mask- Nasal pillows and nasal mask  *Stroke Clinic notes re-reviewed a time of visit today.     Pertinent Medical History:   Current compliance report reviewed and has been scanned into the patient's medical record.    Following taken from previous visit note:  Irineo Jewell is a 63 y.o. male who is here today to follow up with KIRTI and migraines.  Currently on AutoPap 6-16cm, 95th percentile pressure 10.4cm, compliance 90%, AHI 4/hour.  He is tolerating his pressures well most of the time.  He feels he still doesn't sleep well with the AutoPap  therapy- says he has issues breathing with the mask on due to his sinus issues.  He has some daytime sleepiness, regardless of whether he uses his AutoPap machine or not.  He is taking Seroquel 50mg QHS to help with sleep.  He is taking OTC Magnesium to help with sleep also.  He asks about the Inspire Device because he saw a commercial about it.  He is taking Ajovy 225mg SC monthly, Rizatriptan 10mg PRN, Zavzpret PRN, and Ibuprofen PRN.  Feels the Ajovy has helped a great deal with his migraines (ran out of that for a little over a month before he got it back).  He was referred to [facility] in [place] and has an appointment there in November 2024- he hasn't been sooner due to not having PTO time.  He denies any episodes of transient alteration of awareness or signs of stroke.   *Home sleep study on 5/14/2024 showed evidence of KIRTI with an AHI of 13.9/hour and lowest oxygen saturation of 88%.    *DME company- [employer]  *Mask- Alternates between the nasal pillows and nasal mask   *MRI brain with and without contrast on 7/11/2024 showed-  IMPRESSION:  No acute intracranial abnormality.  Scattered cerebral white matter signal abnormalities which may represent  mild chronic ischemic/gliotic changes or changes of vasculitis.  Follow-up MRI may be helpful for further evaluation.     Pertinent Medical History:   Current compliance report reviewed and has been scanned into the patient's medical record.    Subjective      Review of Systems:   Review of Systems   HENT:  Positive for congestion.    Neurological:  Positive for headache.       Medications:     Current Outpatient Medications:   •  allopurinol (ZYLOPRIM) 100 MG tablet, Take 2 tablets by mouth Daily., Disp: , Rfl: 6  •  atorvastatin (LIPITOR) 40 MG tablet, , Disp: , Rfl:   •  Azelastine-Fluticasone 137-50 MCG/ACT suspension, into each nostril., Disp: , Rfl:   •  bisacodyl 5 MG EC tablet, , Disp: , Rfl:   •  cetirizine (zyrTEC) 10 MG tablet, TAKE  1 TABLET (10 MG) BY ORAL ROUTE ONCE DAILY, Disp: , Rfl: 1  •  colchicine 0.6 MG tablet, Take 1 tablet by mouth Daily., Disp: , Rfl:   •  dicyclomine (BENTYL) 20 MG tablet, , Disp: , Rfl:   •  finasteride (PROSCAR) 5 MG tablet, , Disp: , Rfl:   •  Humira, 2 Pen, 40 MG/0.8ML Pen-injector Kit, Inject 40 mg under the skin into the appropriate area as directed., Disp: , Rfl:   •  hydrOXYzine pamoate (VISTARIL) 50 MG capsule, , Disp: , Rfl:   •  ipratropium-albuterol (DUO-NEB) 0.5-2.5 mg/3 ml nebulizer, Take 3 mL by nebulization Every 4 (Four) Hours As Needed for Wheezing or Shortness of Air., Disp: 180 mL, Rfl: 0  •  leflunomide (ARAVA) 20 MG tablet, Take 1 tablet by mouth Daily., Disp: , Rfl:   •  lisinopril (PRINIVIL,ZESTRIL) 20 MG tablet, , Disp: , Rfl:   •  metoprolol tartrate (LOPRESSOR) 50 MG tablet, Take 1 tablet by mouth 2 (Two) Times a Day., Disp: , Rfl:   •  omeprazole (priLOSEC) 40 MG capsule, , Disp: , Rfl:   •  predniSONE (DELTASONE) 20 MG tablet, Take 2 pills with food in the morning for 3 days then 1 pill for 3 days, 1/2 pill for 2 days., Disp: 10 tablet, Rfl: 0  •  pseudoephedrine (SUDAFED) 120 MG 12 hr tablet, TAKE ONE (1) TABLET BY MOUTH EVERY 12 HOURS, Disp: , Rfl:   •  QUEtiapine (SEROquel) 50 MG tablet, Take 1 tablet by mouth Every Night., Disp: 90 tablet, Rfl: 1  •  rizatriptan (Maxalt) 10 MG tablet, Take 1 tablet by mouth 1 (One) Time As Needed for Migraine. May repeat in 2 hours if needed, Disp: 9 tablet, Rfl: 11  •  Testosterone Cypionate (DEPOTESTOTERONE CYPIONATE) 200 MG/ML injection, Inject 1 mL into the appropriate muscle as directed by prescriber., Disp: , Rfl:   •  tiZANidine (ZANAFLEX) 4 MG tablet, Take 1 tablet by mouth At Night As Needed for Muscle Spasms., Disp: , Rfl:   •  Zavegepant HCl (Zavzpret) 10 MG/ACT solution, Administer 10 mg into the nostril(s) as directed by provider As Needed (migraine)., Disp: 2 each, Rfl: 11  •  Atogepant 60 MG tablet, Take 1 tablet by mouth Daily., Disp:  "30 tablet, Rfl: 11  •  Zavegepant HCl (Zavzpret) 10 MG/ACT solution, Administer 10 mg into the nostril(s) as directed by provider Continuous As Needed (FOR MIGRIANE)., Disp: 2 each, Rfl: 0    Allergies:   Allergies   Allergen Reactions   • No Known Drug Allergy        Objective     Physical Exam:  Vital Signs:   Vitals:    04/01/25 1110   BP: 128/84   BP Location: Right arm   Patient Position: Sitting   Cuff Size: Adult   Pulse: 78   Resp: 14   Temp: 98.1 °F (36.7 °C)   TempSrc: Infrared   SpO2: 94%   Weight: 117 kg (257 lb 6.4 oz)   Height: 170.2 cm (67.01\")   PainSc: 5    PainLoc: Head     BMI: Body mass index is 40.3 kg/m².    Physical Exam  Vitals and nursing note reviewed.   Constitutional:       General: He is not in acute distress.     Appearance: Normal appearance. He is well-developed. He is obese. He is not diaphoretic.   HENT:      Head: Normocephalic and atraumatic.   Eyes:      Extraocular Movements: Extraocular movements intact.      Conjunctiva/sclera: Conjunctivae normal.   Pulmonary:      Effort: Pulmonary effort is normal. No respiratory distress.   Musculoskeletal:         General: Normal range of motion.   Skin:     General: Skin is warm and dry.   Neurological:      Mental Status: He is alert and oriented to person, place, and time.   Psychiatric:         Mood and Affect: Mood normal.         Behavior: Behavior normal.         Thought Content: Thought content normal.         Judgment: Judgment normal.         Assessment / Plan      Assessment/Plan:   Diagnoses and all orders for this visit:    1. KIRTI (obstructive sleep apnea) (Primary)  -     Ambulatory Referral to ENT (Otolaryngology)    2. Nasal septal deviation  -     Ambulatory Referral to ENT (Otolaryngology)    3. Migraine without aura and without status migrainosus, not intractable  -     Zavegepant HCl (Zavzpret) 10 MG/ACT solution; Administer 10 mg into the nostril(s) as directed by provider As Needed (migraine).  Dispense: 2 each; " Refill: 11  -     Atogepant 60 MG tablet; Take 1 tablet by mouth Daily.  Dispense: 30 tablet; Refill: 11  -     rizatriptan (Maxalt) 10 MG tablet; Take 1 tablet by mouth 1 (One) Time As Needed for Migraine. May repeat in 2 hours if needed  Dispense: 9 tablet; Refill: 11  -     Zavegepant HCl (Zavzpret) 10 MG/ACT solution; Administer 10 mg into the nostril(s) as directed by provider Continuous As Needed (FOR MIGRIANE).  Dispense: 2 each; Refill: 0    4. Intractable chronic migraine without aura and without status migrainosus    5. Insomnia, unspecified type    *Referral to [facility] to discuss deviated septum and the Inspire Device as a treatment for KIRTI.   *I have discussed the implications of untreated significant KIRTI in regards to cardiovascular health and risk of stroke. He should sleep on his side and sleep with the head of his bed elevated as much as possible.   *Order for Atogepant since he is no longer taking Ajovy and had good results with Atogepant samples.   *Sample of Zavzpret given in office today as he feels a migraine starting.     Follow Up:   Return in about 1 year (around 4/1/2026) for Follow Up.    *Order for PAP supplies sent to patient's [employer] company.     I have advised the patient the need to continue the use of CPAP.  Gold standard for treatment of sleep apnea includes weight loss, use of cpap and avoidance of alcohol.  Encouraged weight loss (if applicable) with a BMI goal of 24.  Untreated KIRTI may increase the risk for development of hypertension, stroke, myocardial infarction, diabetes, cardiovascular disease, work-related issues and driving accidents. I have counseled and advised the patient to avoid driving or operating heavy/dangerous equipment if feeling drowsy.     LATOYA Lan, FNP-C  [facility]